# Patient Record
Sex: FEMALE | Employment: OTHER | URBAN - METROPOLITAN AREA
[De-identification: names, ages, dates, MRNs, and addresses within clinical notes are randomized per-mention and may not be internally consistent; named-entity substitution may affect disease eponyms.]

---

## 2024-03-28 ENCOUNTER — TELEPHONE (OUTPATIENT)
Dept: SURGERY | Age: 74
End: 2024-03-28

## 2024-03-28 DIAGNOSIS — C76.0 HEAD AND NECK CANCER (HCC): ICD-10-CM

## 2024-03-28 DIAGNOSIS — I87.8 POOR VENOUS ACCESS: ICD-10-CM

## 2024-03-28 NOTE — TELEPHONE ENCOUNTER
Per Dr. Blanco, patient is scheduled for EGD PEG tube insertion and Mediport insertion  at [facility] on 24. Surgery scheduled via epic, surgeon's calendar updated. Dr. Blnaco to enter orders. Follow up as needed.   Most recent labs/medication list/records requested from PCP.   Port and PEG to be managed by the [facility].   Electronically signed by Kaelyn Saucedo RN on 3/28/2024 at 1:57 PM    Prior Authorization Form:      DEMOGRAPHICS:                     Patient Name:  Adrienne Lambert  Patient :  1950            Insurance:  Payor: MEDICARE / Plan: MEDICARE PART A AND B / Product Type: *No Product type* /   Insurance ID Number:    Payer/Plan Subscr  Sex Relation Sub. Ins. ID Effective Group Num   1. MEDICARE - ME* ADRIENNE LAMBERT 1950 Female Self [id number] 3/1/24                                    PO [address]          DIAGNOSIS & PROCEDURE:                       Procedure/Operation: EGD PEG tube insertion and Mediport insertion            CPT Code: 12464 + 47200    Diagnosis:  head and neck cancer, poor venous access    ICD10 Code: C76.0, I87.8     Location:  [facility]    Surgeon:  Bella    SCHEDULING INFORMATION:                          Date: 24    Time: TBD              Anesthesia:  MAC/TIVA                                                       Status:  Outpatient        Special Comments:         Electronically signed by Kaelyn Saucedo RN on 3/28/2024 at 1:57 PM

## 2024-03-28 NOTE — PROGRESS NOTES
I recommended a preadmission testing appointment for patient.  Patient stated she would be out of town until 4/7 and would have to receive any testing day of surgery.

## 2024-03-29 RX ORDER — LOSARTAN POTASSIUM 100 MG/1
100 TABLET ORAL
COMMUNITY
Start: 2024-03-18 | End: 2024-06-16

## 2024-03-29 RX ORDER — LEVOTHYROXINE SODIUM 0.12 MG/1
125 TABLET ORAL DAILY
COMMUNITY

## 2024-03-29 RX ORDER — DICLOFENAC SODIUM 75 MG/1
75 TABLET, DELAYED RELEASE ORAL 2 TIMES DAILY
COMMUNITY
Start: 2024-03-18

## 2024-04-03 ENCOUNTER — SCHEDULED TELEPHONE ENCOUNTER (OUTPATIENT)
Dept: SURGERY | Age: 74
End: 2024-04-03
Payer: MEDICARE

## 2024-04-03 DIAGNOSIS — I87.8 POOR VENOUS ACCESS: Primary | ICD-10-CM

## 2024-04-03 DIAGNOSIS — C76.0 HEAD AND NECK CANCER (HCC): ICD-10-CM

## 2024-04-03 PROCEDURE — 99204 OFFICE O/P NEW MOD 45 MIN: CPT | Performed by: SURGERY

## 2024-04-03 PROCEDURE — 1123F ACP DISCUSS/DSCN MKR DOCD: CPT | Performed by: SURGERY

## 2024-04-03 RX ORDER — CLINDAMYCIN HYDROCHLORIDE 300 MG/1
CAPSULE ORAL
COMMUNITY
Start: 2024-04-01

## 2024-04-04 NOTE — PROGRESS NOTES
[facility]                                                                                                                    PRE OP INSTRUCTIONS FOR  Adrienne Garces        Date: 4/4/2024    Date of surgery: 04/08/2024   Arrival Time: Hospital will call you between 5pm and 7pm with your final arrival time for surgery. Go to     front of hospital and check in at information desk.    Nothing by mouth (NPO) as instructed. May have clear liquids up to 2 hours prior to surgery. Nothing solid after midnight. Examples: water, apple juice, black coffee, plain tea    Take the following medications with a small sip of water on the morning of Surgery: synthroid     Diabetics may take half the evening dose of insulin but none after midnight.  If you feel symptomatic or have low blood sugar morning of surgery drink 1-2 ounces of apple juice only. If you take a weekly insulin injection _______________, stop 7 days prior to surgery. If you take _______________, stop 3-4 days prior to surgery.    Aspirin, Ibuprofen, Advil, Naproxen, other Anti-inflammatory products should be stopped before surgery as directed by your surgeon, cardiologist, or primary care Doctor. Herbal supplements and Vitamin E should be stopped five days prior.  May take Tylenol unless instructed otherwise by your surgeon.    Check with your Doctor regarding stopping Plavix, Coumadin, Lovenox, Eliquis, Effient, or other blood thinners such as, pradaxa, lixiana, xaralto and savaysa.    Do not smoke, vape, or use illicit drugs and do not drink any alcoholic beverages 24 hours prior to surgery.    You may brush your teeth the morning of surgery.      You MUST make arrangements for a responsible adult, 18 and over, to take you home after your surgery. You will not be allowed to leave alone or drive yourself home.  You will need someone stay with you the first 24 hrs. Your surgery will be cancelled if you do not have a ride home or someone

## 2024-04-04 NOTE — PROGRESS NOTES
General Surgery History and Physical  [facility]  Telephone visit    Start time: 1300  End time: 1310  Services were provided through a telephone synchronous discussion virtually to substitute for in-person clinic visit. The patient was advised of the risks, benefits and alternatives to telemedicine and agreed to proceed with this type of visit.  Pursuant to the emergency declaration under the [place] Act and the National Emergencies Act, 1135 waiver authority and the Coronavirus Preparedness and Response Supplemental Appropriations Act, this Virtual  Visit was conducted, with patient's consent, to reduce the patient's risk of exposure to COVID-19 and provide continuity of care. The patient was also advised the privacy standards of a standard visit continue to apply to telemedicine visits.   Time spent time with telephone encounter with patient and family counciling and discussing care exceeded 10 min. Additional time spent reviewing images and labs, discussing case with nursing, support staff and other physicians; as well as coordinating care exceeded 10 min.        Patient's Name/Date of Birth: Adrienne Garces / 1950    Date: April 4, 2024     Surgeon: Adrian Blanco M.D.    PCP: Zachariah Randolph APRN - CNP     Chief Complaint: Poor venous access, mediport placement, PEG placement    HPI:   Adrienne Garces is a 73 y.o. female who presents for evaluation of poor venous access and mediport placement. Patient has a history of head and neck cancer and plans to begin Chemotherapy as soon as possible. They need a central venous catheter placed prior to commencing treatment.    Patient Active Problem List   Diagnosis    Poor venous access    Head and neck cancer (HCC)       Past Medical History:   Diagnosis Date    Arthritis     Hypertension     Hypothyroidism     Stomach ulcer        Past Surgical History:   Procedure Laterality Date    COLONOSCOPY  2022    Dr. Rosen /  Colon polyp

## 2024-04-08 ENCOUNTER — HOSPITAL ENCOUNTER (OUTPATIENT)
Dept: GENERAL RADIOLOGY | Age: 74
Discharge: HOME | End: 2024-04-10
Attending: SURGERY
Payer: MEDICARE

## 2024-04-08 ENCOUNTER — APPOINTMENT (OUTPATIENT)
Dept: GENERAL RADIOLOGY | Age: 74
End: 2024-04-08
Attending: SURGERY
Payer: MEDICARE

## 2024-04-08 ENCOUNTER — HOSPITAL ENCOUNTER (OUTPATIENT)
Age: 74
Setting detail: OUTPATIENT SURGERY
Discharge: HOME | End: 2024-04-08
Attending: SURGERY | Admitting: SURGERY
Payer: MEDICARE

## 2024-04-08 ENCOUNTER — ANESTHESIA EVENT (OUTPATIENT)
Dept: OPERATING ROOM | Age: 74
End: 2024-04-08
Payer: MEDICARE

## 2024-04-08 ENCOUNTER — ANESTHESIA (OUTPATIENT)
Dept: OPERATING ROOM | Age: 74
End: 2024-04-08
Payer: MEDICARE

## 2024-04-08 VITALS
DIASTOLIC BLOOD PRESSURE: 70 MMHG | TEMPERATURE: 97.7 F | SYSTOLIC BLOOD PRESSURE: 151 MMHG | RESPIRATION RATE: 16 BRPM | OXYGEN SATURATION: 96 % | HEART RATE: 62 BPM

## 2024-04-08 DIAGNOSIS — G89.18 POSTOPERATIVE PAIN: Primary | ICD-10-CM

## 2024-04-08 DIAGNOSIS — I87.8 POOR VENOUS ACCESS: ICD-10-CM

## 2024-04-08 PROBLEM — R63.8 DECREASED ORAL INTAKE: Status: ACTIVE | Noted: 2024-04-08

## 2024-04-08 LAB
EKG ATRIAL RATE: 61 BPM
EKG P AXIS: 46 DEGREES
EKG P-R INTERVAL: 160 MS
EKG Q-T INTERVAL: 448 MS
EKG QRS DURATION: 88 MS
EKG QTC CALCULATION (BAZETT): 450 MS
EKG R AXIS: 26 DEGREES
EKG T AXIS: 103 DEGREES
EKG VENTRICULAR RATE: 61 BPM
ERYTHROCYTE [DISTWIDTH] IN BLOOD BY AUTOMATED COUNT: 13.7 % (ref 11.5–15)
HCT VFR BLD AUTO: 42.7 % (ref 34–48)
HGB BLD-MCNC: 13.9 G/DL (ref 11.5–15.5)
MCH RBC QN AUTO: 29.1 PG (ref 26–35)
MCHC RBC AUTO-ENTMCNC: 32.6 G/DL (ref 32–34.5)
MCV RBC AUTO: 89.3 FL (ref 80–99.9)
PLATELET # BLD AUTO: 237 K/UL (ref 130–450)
PMV BLD AUTO: 9.6 FL (ref 7–12)
RBC # BLD AUTO: 4.78 M/UL (ref 3.5–5.5)
WBC OTHER # BLD: 5.6 K/UL (ref 4.5–11.5)

## 2024-04-08 PROCEDURE — 2720000010 HC SURG SUPPLY STERILE: Performed by: SURGERY

## 2024-04-08 PROCEDURE — 6370000000 HC RX 637 (ALT 250 FOR IP): Performed by: SURGERY

## 2024-04-08 PROCEDURE — 3700000001 HC ADD 15 MINUTES (ANESTHESIA): Performed by: SURGERY

## 2024-04-08 PROCEDURE — 6360000002 HC RX W HCPCS: Performed by: SURGERY

## 2024-04-08 PROCEDURE — 2580000003 HC RX 258: Performed by: SURGERY

## 2024-04-08 PROCEDURE — 85027 COMPLETE CBC AUTOMATED: CPT

## 2024-04-08 PROCEDURE — 2580000003 HC RX 258: Performed by: NURSE ANESTHETIST, CERTIFIED REGISTERED

## 2024-04-08 PROCEDURE — 7100000010 HC PHASE II RECOVERY - FIRST 15 MIN: Performed by: SURGERY

## 2024-04-08 PROCEDURE — 77001 FLUOROGUIDE FOR VEIN DEVICE: CPT | Performed by: SURGERY

## 2024-04-08 PROCEDURE — 2709999900 HC NON-CHARGEABLE SUPPLY: Performed by: SURGERY

## 2024-04-08 PROCEDURE — C1788 PORT, INDWELLING, IMP: HCPCS | Performed by: SURGERY

## 2024-04-08 PROCEDURE — 71045 X-RAY EXAM CHEST 1 VIEW: CPT

## 2024-04-08 PROCEDURE — 3600000003 HC SURGERY LEVEL 3 BASE: Performed by: SURGERY

## 2024-04-08 PROCEDURE — 99222 1ST HOSP IP/OBS MODERATE 55: CPT | Performed by: SURGERY

## 2024-04-08 PROCEDURE — 6360000002 HC RX W HCPCS: Performed by: NURSE ANESTHETIST, CERTIFIED REGISTERED

## 2024-04-08 PROCEDURE — A4216 STERILE WATER/SALINE, 10 ML: HCPCS | Performed by: SURGERY

## 2024-04-08 PROCEDURE — 7100000011 HC PHASE II RECOVERY - ADDTL 15 MIN: Performed by: SURGERY

## 2024-04-08 PROCEDURE — 2500000003 HC RX 250 WO HCPCS: Performed by: SURGERY

## 2024-04-08 PROCEDURE — 36561 INSERT TUNNELED CV CATH: CPT | Performed by: SURGERY

## 2024-04-08 PROCEDURE — 43246 EGD PLACE GASTROSTOMY TUBE: CPT | Performed by: SURGERY

## 2024-04-08 PROCEDURE — 93005 ELECTROCARDIOGRAM TRACING: CPT | Performed by: ANESTHESIOLOGY

## 2024-04-08 PROCEDURE — 93010 ELECTROCARDIOGRAM REPORT: CPT | Performed by: INTERNAL MEDICINE

## 2024-04-08 PROCEDURE — 6370000000 HC RX 637 (ALT 250 FOR IP): Performed by: ANESTHESIOLOGY

## 2024-04-08 PROCEDURE — 76937 US GUIDE VASCULAR ACCESS: CPT | Performed by: SURGERY

## 2024-04-08 PROCEDURE — 3600000013 HC SURGERY LEVEL 3 ADDTL 15MIN: Performed by: SURGERY

## 2024-04-08 PROCEDURE — 3700000000 HC ANESTHESIA ATTENDED CARE: Performed by: SURGERY

## 2024-04-08 PROCEDURE — 2580000003 HC RX 258: Performed by: ANESTHESIOLOGY

## 2024-04-08 DEVICE — SMART PORT CT SINGLE TITANIUM PORT SYSTEM WITH ATTACHABLE 8.0F X 66CM POLYURETHANE CATHETER AND 8 F INTRODUCER KIT (WITH VALVED INTRODUCER)
Type: IMPLANTABLE DEVICE | Site: ABDOMEN | Status: FUNCTIONAL
Brand: SMART PORT CT

## 2024-04-08 RX ORDER — OXYCODONE HYDROCHLORIDE AND ACETAMINOPHEN 5; 325 MG/1; MG/1
1 TABLET ORAL EVERY 6 HOURS PRN
Qty: 10 TABLET | Refills: 0 | Status: SHIPPED | OUTPATIENT
Start: 2024-04-08 | End: 2024-04-11

## 2024-04-08 RX ORDER — CLINDAMYCIN PHOSPHATE 900 MG/50ML
900 INJECTION, SOLUTION INTRAVENOUS ONCE
Status: COMPLETED | OUTPATIENT
Start: 2024-04-08 | End: 2024-04-08

## 2024-04-08 RX ORDER — SODIUM CHLORIDE, SODIUM LACTATE, POTASSIUM CHLORIDE, CALCIUM CHLORIDE 600; 310; 30; 20 MG/100ML; MG/100ML; MG/100ML; MG/100ML
INJECTION, SOLUTION INTRAVENOUS CONTINUOUS PRN
Status: DISCONTINUED | OUTPATIENT
Start: 2024-04-08 | End: 2024-04-08 | Stop reason: SDUPTHER

## 2024-04-08 RX ORDER — HYDRALAZINE HYDROCHLORIDE 20 MG/ML
10 INJECTION INTRAMUSCULAR; INTRAVENOUS
Status: DISCONTINUED | OUTPATIENT
Start: 2024-04-08 | End: 2024-04-08 | Stop reason: HOSPADM

## 2024-04-08 RX ORDER — HEPARIN 100 UNIT/ML
SYRINGE INTRAVENOUS PRN
Status: DISCONTINUED | OUTPATIENT
Start: 2024-04-08 | End: 2024-04-08 | Stop reason: ALTCHOICE

## 2024-04-08 RX ORDER — FENTANYL CITRATE 50 UG/ML
INJECTION, SOLUTION INTRAMUSCULAR; INTRAVENOUS PRN
Status: DISCONTINUED | OUTPATIENT
Start: 2024-04-08 | End: 2024-04-08 | Stop reason: SDUPTHER

## 2024-04-08 RX ORDER — KETOROLAC TROMETHAMINE 15 MG/ML
15 INJECTION, SOLUTION INTRAMUSCULAR; INTRAVENOUS
Status: DISCONTINUED | OUTPATIENT
Start: 2024-04-08 | End: 2024-04-08 | Stop reason: HOSPADM

## 2024-04-08 RX ORDER — LIDOCAINE HYDROCHLORIDE 20 MG/ML
INJECTION, SOLUTION INTRAVENOUS PRN
Status: DISCONTINUED | OUTPATIENT
Start: 2024-04-08 | End: 2024-04-08 | Stop reason: SDUPTHER

## 2024-04-08 RX ORDER — IPRATROPIUM BROMIDE AND ALBUTEROL SULFATE 2.5; .5 MG/3ML; MG/3ML
1 SOLUTION RESPIRATORY (INHALATION)
Status: DISCONTINUED | OUTPATIENT
Start: 2024-04-08 | End: 2024-04-08 | Stop reason: HOSPADM

## 2024-04-08 RX ORDER — SODIUM CHLORIDE 0.9 % (FLUSH) 0.9 %
5-40 SYRINGE (ML) INJECTION PRN
Status: DISCONTINUED | OUTPATIENT
Start: 2024-04-08 | End: 2024-04-08 | Stop reason: HOSPADM

## 2024-04-08 RX ORDER — MIDAZOLAM HYDROCHLORIDE 1 MG/ML
INJECTION INTRAMUSCULAR; INTRAVENOUS PRN
Status: DISCONTINUED | OUTPATIENT
Start: 2024-04-08 | End: 2024-04-08 | Stop reason: SDUPTHER

## 2024-04-08 RX ORDER — SODIUM CHLORIDE 9 MG/ML
INJECTION, SOLUTION INTRAMUSCULAR; INTRAVENOUS; SUBCUTANEOUS PRN
Status: DISCONTINUED | OUTPATIENT
Start: 2024-04-08 | End: 2024-04-08 | Stop reason: ALTCHOICE

## 2024-04-08 RX ORDER — MEPERIDINE HYDROCHLORIDE 25 MG/ML
12.5 INJECTION INTRAMUSCULAR; INTRAVENOUS; SUBCUTANEOUS EVERY 5 MIN PRN
Status: DISCONTINUED | OUTPATIENT
Start: 2024-04-08 | End: 2024-04-08 | Stop reason: HOSPADM

## 2024-04-08 RX ORDER — DIPHENHYDRAMINE HYDROCHLORIDE 50 MG/ML
12.5 INJECTION INTRAMUSCULAR; INTRAVENOUS
Status: DISCONTINUED | OUTPATIENT
Start: 2024-04-08 | End: 2024-04-08 | Stop reason: HOSPADM

## 2024-04-08 RX ORDER — NALOXONE HYDROCHLORIDE 0.4 MG/ML
INJECTION, SOLUTION INTRAMUSCULAR; INTRAVENOUS; SUBCUTANEOUS PRN
Status: DISCONTINUED | OUTPATIENT
Start: 2024-04-08 | End: 2024-04-08 | Stop reason: HOSPADM

## 2024-04-08 RX ORDER — MIDAZOLAM HYDROCHLORIDE 1 MG/ML
2 INJECTION INTRAMUSCULAR; INTRAVENOUS
Status: DISCONTINUED | OUTPATIENT
Start: 2024-04-08 | End: 2024-04-08 | Stop reason: HOSPADM

## 2024-04-08 RX ORDER — FENTANYL CITRATE 0.05 MG/ML
25 INJECTION, SOLUTION INTRAMUSCULAR; INTRAVENOUS EVERY 5 MIN PRN
Status: DISCONTINUED | OUTPATIENT
Start: 2024-04-08 | End: 2024-04-08 | Stop reason: HOSPADM

## 2024-04-08 RX ORDER — SODIUM CHLORIDE 0.9 % (FLUSH) 0.9 %
5-40 SYRINGE (ML) INJECTION EVERY 12 HOURS SCHEDULED
Status: DISCONTINUED | OUTPATIENT
Start: 2024-04-08 | End: 2024-04-08 | Stop reason: HOSPADM

## 2024-04-08 RX ORDER — LABETALOL HYDROCHLORIDE 5 MG/ML
10 INJECTION, SOLUTION INTRAVENOUS
Status: DISCONTINUED | OUTPATIENT
Start: 2024-04-08 | End: 2024-04-08 | Stop reason: HOSPADM

## 2024-04-08 RX ORDER — SODIUM CHLORIDE, SODIUM LACTATE, POTASSIUM CHLORIDE, CALCIUM CHLORIDE 600; 310; 30; 20 MG/100ML; MG/100ML; MG/100ML; MG/100ML
INJECTION, SOLUTION INTRAVENOUS CONTINUOUS
Status: DISCONTINUED | OUTPATIENT
Start: 2024-04-08 | End: 2024-04-08 | Stop reason: HOSPADM

## 2024-04-08 RX ORDER — SODIUM CHLORIDE 9 MG/ML
INJECTION, SOLUTION INTRAVENOUS CONTINUOUS
Status: DISCONTINUED | OUTPATIENT
Start: 2024-04-08 | End: 2024-04-08 | Stop reason: HOSPADM

## 2024-04-08 RX ORDER — FENTANYL CITRATE 0.05 MG/ML
50 INJECTION, SOLUTION INTRAMUSCULAR; INTRAVENOUS EVERY 5 MIN PRN
Status: DISCONTINUED | OUTPATIENT
Start: 2024-04-08 | End: 2024-04-08 | Stop reason: HOSPADM

## 2024-04-08 RX ORDER — OXYCODONE HYDROCHLORIDE AND ACETAMINOPHEN 5; 325 MG/1; MG/1
1 TABLET ORAL ONCE
Status: COMPLETED | OUTPATIENT
Start: 2024-04-08 | End: 2024-04-08

## 2024-04-08 RX ORDER — KETOROLAC TROMETHAMINE 30 MG/ML
INJECTION, SOLUTION INTRAMUSCULAR; INTRAVENOUS PRN
Status: DISCONTINUED | OUTPATIENT
Start: 2024-04-08 | End: 2024-04-08 | Stop reason: SDUPTHER

## 2024-04-08 RX ORDER — PROPOFOL 10 MG/ML
INJECTION, EMULSION INTRAVENOUS PRN
Status: DISCONTINUED | OUTPATIENT
Start: 2024-04-08 | End: 2024-04-08 | Stop reason: SDUPTHER

## 2024-04-08 RX ORDER — SODIUM CHLORIDE 9 MG/ML
INJECTION, SOLUTION INTRAVENOUS PRN
Status: DISCONTINUED | OUTPATIENT
Start: 2024-04-08 | End: 2024-04-08 | Stop reason: HOSPADM

## 2024-04-08 RX ADMIN — FENTANYL CITRATE 50 MCG: 50 INJECTION, SOLUTION INTRAMUSCULAR; INTRAVENOUS at 13:34

## 2024-04-08 RX ADMIN — OXYCODONE AND ACETAMINOPHEN 1 TABLET: 5; 325 TABLET ORAL at 15:03

## 2024-04-08 RX ADMIN — LIDOCAINE HYDROCHLORIDE 20 MG: 20 INJECTION, SOLUTION INTRAVENOUS at 13:34

## 2024-04-08 RX ADMIN — PROPOFOL 30 MG: 10 INJECTION, EMULSION INTRAVENOUS at 14:02

## 2024-04-08 RX ADMIN — SODIUM CHLORIDE, POTASSIUM CHLORIDE, SODIUM LACTATE AND CALCIUM CHLORIDE: 600; 310; 30; 20 INJECTION, SOLUTION INTRAVENOUS at 12:26

## 2024-04-08 RX ADMIN — MIDAZOLAM 1 MG: 1 INJECTION INTRAMUSCULAR; INTRAVENOUS at 13:20

## 2024-04-08 RX ADMIN — PROPOFOL 70 MCG/KG/MIN: 10 INJECTION, EMULSION INTRAVENOUS at 13:35

## 2024-04-08 RX ADMIN — PROPOFOL 50 MG: 10 INJECTION, EMULSION INTRAVENOUS at 13:34

## 2024-04-08 RX ADMIN — FENTANYL CITRATE 25 MCG: 50 INJECTION, SOLUTION INTRAMUSCULAR; INTRAVENOUS at 13:44

## 2024-04-08 RX ADMIN — CLINDAMYCIN IN 5 PERCENT DEXTROSE 900 MG: 18 INJECTION, SOLUTION INTRAVENOUS at 13:20

## 2024-04-08 RX ADMIN — SODIUM CHLORIDE, POTASSIUM CHLORIDE, SODIUM LACTATE AND CALCIUM CHLORIDE: 600; 310; 30; 20 INJECTION, SOLUTION INTRAVENOUS at 13:20

## 2024-04-08 RX ADMIN — KETOROLAC TROMETHAMINE 15 MG: 30 INJECTION, SOLUTION INTRAMUSCULAR at 14:12

## 2024-04-08 RX ADMIN — FENTANYL CITRATE 25 MCG: 50 INJECTION, SOLUTION INTRAMUSCULAR; INTRAVENOUS at 14:03

## 2024-04-08 ASSESSMENT — PAIN DESCRIPTION - LOCATION: LOCATION: ABDOMEN

## 2024-04-08 ASSESSMENT — PAIN - FUNCTIONAL ASSESSMENT
PAIN_FUNCTIONAL_ASSESSMENT: 0-10
PAIN_FUNCTIONAL_ASSESSMENT: NONE - DENIES PAIN
PAIN_FUNCTIONAL_ASSESSMENT: 0-10

## 2024-04-08 ASSESSMENT — PAIN SCALES - GENERAL: PAINLEVEL_OUTOF10: 7

## 2024-04-08 NOTE — DISCHARGE INSTRUCTIONS
stomach (nauseated) or you may vomit. Sometimes anesthesia can make you feel sick. It's a common side effect and often doesn't last long. Pain also can make you feel sick or vomit. After the anesthesia wears off, you may feel pain from the incision (cut). That pain could then upset your stomach. Taking pain medicine can also make you feel sick to your stomach.  Whatever the cause, you may get medicine that can help. There are also some things you can do at home to prevent nausea and feel better.  The doctor has checked you carefully, but problems can develop later. If you notice any problems or new symptoms, get medical treatment right away.  Follow-up care is a key part of your treatment and safety. Be sure to make and go to all appointments, and call your doctor if you are having problems. It's also a good idea to know your test results and keep a list of the medicines you take.  How can you care for yourself at home?  Be safe with medicines. Read and follow all instructions on the label.  If the doctor gave you a prescription medicine for pain, take it as prescribed.  If you are not taking a prescription pain medicine, ask your doctor if you can take an over-the-counter medicine.  Take your pain medicine as soon as you have pain. It works better if you take it before the pain gets bad.  Call your doctor if you have any problems with your medicine.  Rest in bed until you feel better.  To prevent dehydration, drink plenty of fluids. Choose water and other clear liquids until you feel better. If you have kidney, heart, or liver disease and have to limit fluids, talk with your doctor before you increase the amount of fluids you drink.  When you are able to eat, try clear soups, mild foods, and liquids until all symptoms are gone for 12 to 48 hours. Other good choices include dry toast, crackers, cooked cereal, and gelatin dessert, such as Jell-O.  Do not smoke. Smoking and being around smoke can make nausea worse. If  to https://www.[employer].net/patientEd and enter C340 to learn more about \"Infection After Surgery: Care Instructions.\"  Current as of: July 26, 2023               Content Version: 14.0  © 5885-9319 [employer].   Care instructions adapted under license by [employer]. If you have questions about a medical condition or this instruction, always ask your healthcare professional. [employer] disclaims any warranty or liability for your use of this information.       <-- Click to add NO pertinent Past Medical History

## 2024-04-08 NOTE — OP NOTE
Adrienne Garces  [facility]   Operative Report    DATE OF PROCEDURE: 4/8/2024    SURGEON: Dr. Adrian Blanco MD    ASSISTANT: Bri PAULA, Jennifer Lobato, [employer]     PREOPERATIVE DIAGNOSIS: Venous insufficiency (I87.2), Head and neck Cancer    POSTOPERATIVE DIAGNOSIS: Same.     OPERATION:  Insertion of central venous catheter with subcutaneous port.(72355); Fluoroscopy; Ultrasound guidance    ANESTHESIA: LMAC     ESTIMATED BLOOD LOSS: 2 cc    COMPLICATIONS: None.     IMPLANT:  Implant Name Type Inv. Item Serial No.  Lot No. LRB No. Used Action   PORT SMARTPORT 8FR CT TI W/VLV [facility] - [id number] Port PORT SMARTPORT 8FR CT TI W/VLV [facility]  [facility] [id number] N/A 1 Implanted       HISTORY: Adrienne Garces is a  73 y.o.  female who has poor venous access and head and neck cancer with need for access to receive chemotherapy.    OPERATION: The patient was placed on the table in a supine position. She  was given IV antibiotics within 30min of incision.  The skin was prepped with ChloroPrep and draped in the usual fashion. The patient was placed in a headdown position. Ultrasound was used to find the left internal jugular vein.   The skin was numbed with marcaine plus epinephrine.  The vein was canulated and the wire passed easily. The puncture skin site was enlarged with a 15 blade scalpel. The chest skin was numbed with lidocaine and an incision was made for the port. Cautery was used to dissect down and form a pocket. Tunneling device was attached to the catheter and tunneled from the neck to the chest incision. The introducer dilator was passed over the wire and the catheter was passed through the introducer so the tip was in the superior vena cava using fluoroscopy. The catheter was cut to the correct length under fluoroscopic guidance.  The port was attached to the catheter and placed into the pocket. Sutures were placed in the chest  fascia and clamped with a hemostat.  The sutures holding the port to the fascia were tied. A Seaman needle was used to access the port and flush with saline. The catheter mariola back blood well. It was then flushed with Heplock solution. The dermis was closed with 4-0 Monocryl. Derma+flex glue was placed on the incision, no dressing. The needle and sponge count were correct. The patient tolerated the procedure well and went to recovery in stable condition.  A chest X-ray was ordered to check for catheter placement.    Physician Signature: Electronically signed by Dr. Adrian Blanco MD, M.D.

## 2024-04-08 NOTE — ANESTHESIA PRE PROCEDURE
\"GFRAA\", \"AGRATIO\", \"LABGLOM\", \"GLUCOSE\", \"GLU\", \"PROT\", \"CALCIUM\", \"BILITOT\", \"ALKPHOS\", \"AST\", \"ALT\"    POC Tests: No results for input(s): \"POCGLU\", \"POCNA\", \"POCK\", \"POCCL\", \"POCBUN\", \"POCHEMO\", \"POCHCT\" in the last 72 hours.    Coags: No results found for: \"PROTIME\", \"INR\", \"APTT\"    HCG (If Applicable): No results found for: \"PREGTESTUR\", \"PREGSERUM\", \"HCG\", \"HCGQUANT\"     ABGs: No results found for: \"PHART\", \"PO2ART\", \"XOG4MHU\", \"TAJ0KEY\", \"BEART\", \"J9ETCTCG\"     Type & Screen (If Applicable):  No results found for: \"LABABO\", \"LABRH\"    Drug/Infectious Status (If Applicable):  No results found for: \"HIV\", \"HEPCAB\"    COVID-19 Screening (If Applicable): No results found for: \"COVID19\"        Anesthesia Evaluation  Patient summary reviewed   no history of anesthetic complications:   Airway: Mallampati: II  TM distance: >3 FB   Neck ROM: full  Mouth opening: > = 3 FB   Dental: normal exam         Pulmonary:Negative Pulmonary ROS breath sounds clear to auscultation                             Cardiovascular:    (+) hypertension:        Rhythm: regular  Rate: normal                    Neuro/Psych:   Negative Neuro/Psych ROS              GI/Hepatic/Renal:   (+) PUD          Endo/Other:    (+) hypothyroidism::., malignancy/cancer (Head and neck cancer-to start radiation treatment and chemotherapy later this week).    (-) blood dyscrasia               Abdominal:             Vascular: negative vascular ROS.         Other Findings:             Anesthesia Plan      MAC     ASA 3           MIPS: Postoperative opioids intended and Prophylactic antiemetics administered.  Anesthetic plan and risks discussed with patient.      Plan discussed with CRNA.                DOS STAFF ADDENDUM:    Pt seen and examined, chart reviewed (including anesthesia, drug and allergy history).       Anesthetic plan, risks, benefits, alternatives, and personnel involved discussed with patient.  Patient verbalized an understanding and agrees to

## 2024-04-08 NOTE — H&P
General Surgery History and Physical  [facility]    Patient's Name/Date of Birth: Adrienne Garces / 1950    Date: April 8, 2024     Surgeon: Adrian Blanco M.D.    PCP: Zachariah Randolph APRN - CNP     Chief Complaint: Poor venous access, mediport placement    HPI:   Adrienne Garces is a 73 y.o. female who presents for evaluation of poor venous access and mediport placement. Patient has a history of oral cancer and plans to begin Chemotherapy as soon as possible. They need a central venous catheter placed prior to commencing treatment.    Patient Active Problem List   Diagnosis    Poor venous access    Head and neck cancer (HCC)       Past Medical History:   Diagnosis Date    Arthritis     Cancer (HCC)     Hypertension     Hypothyroidism     Stomach ulcer        Past Surgical History:   Procedure Laterality Date    COLONOSCOPY  2022    Dr. Rosen /  Colon polyp    ESOPHAGOGASTRODUODENOSCOPY  2022    Dr. Rosen / History of Ulcers    LARYNGOSCOPY  2024    Dr. Doss    TOTAL KNEE ARTHROPLASTY Right 10/2023    TUBAL LIGATION  1984       Allergies   Allergen Reactions    Escitalopram Other (See Comments)     Lower extremity edema     Ginkgo Biloba Rash    Keflex [Cephalexin] Rash    Penicillins Rash    Sulfa Antibiotics Rash       The patient has a family history that is negative for severe cardiovascular or respiratory issues, negative for reaction to anesthesia.    Social History     Socioeconomic History    Marital status:      Spouse name: Not on file    Number of children: Not on file    Years of education: Not on file    Highest education level: Not on file   Occupational History    Not on file   Tobacco Use    Smoking status: Never    Smokeless tobacco: Never   Substance and Sexual Activity    Alcohol use: Yes     Alcohol/week: 1.0 standard drink of alcohol     Types: 1 Glasses of wine per week     Comment: 1-2 drinks a week mixed drink    Drug use: Never    Sexual activity:

## 2024-04-08 NOTE — ANESTHESIA POSTPROCEDURE EVALUATION
Department of Anesthesiology  Postprocedure Note    Patient: Adrienne Garces  MRN: [id number]  YOB: 1950  Date of evaluation: 4/8/2024    Procedure Summary       Date: 04/08/24 Room / Location: [address]    Anesthesia Start: 1320 Anesthesia Stop: 1418    Procedures:       EGD PEG TUBE INSERTION AND MEDIPORT INSERTION [14591 + 67733] (Chest)      ESOPHAGOGASTRODUODENOSCOPY PERCUTANEOUS ENDOSCOPIC GASTROSTOMY TUBE PLACEMENT (Abdomen) Diagnosis:       Poor venous access      Head and neck cancer (HCC)      (Poor venous access [I87.8])      (Head and neck cancer (HCC) [C76.0])    Surgeons: Adrian Blanco MD Responsible Provider: Quinn Crespo MD    Anesthesia Type: MAC ASA Status: 3            Anesthesia Type: No value filed.    Kvng Phase I: Kvng Score: 10    Kvng Phase II: Kvng Score: 10    Anesthesia Post Evaluation    Patient location during evaluation: PACU  Patient participation: complete - patient participated  Level of consciousness: awake  Airway patency: patent  Nausea & Vomiting: no nausea and no vomiting  Cardiovascular status: hemodynamically stable  Respiratory status: acceptable  Hydration status: euvolemic  Pain management: adequate    No notable events documented.

## 2024-04-08 NOTE — OP NOTE
[facility]  OPERATIVE NOTE    4/8/2024  Adrienne Garces  1:59 PM    PREOPERATIVE DIAGNOSES: Malnutrition, dysphagia, head and neck cancer    POSTOPERATIVE DIAGNOSES: Malnutrition, dysphagia, head and neck cancer    OPERATION: Percutaneous endoscopic gastrostomy tube placement with upper endoscopy.     SURGEON: Adrian Blanco M.D.    ASSISTANT: KEARA Gregory     ANESTHESIA: MAC     ESTIMATED BLOOD LOSS: Minimal.     COMPLICATIONS: None.     SPECIMEN: None.     DESCRIPTION OF PROCEDURE: PROCEDURE: The patient was taken to the endoscopy suite and placed on the endoscopy table in a supine position. LMAC anesthesia was administered. A bite block was inserted.   A lubricated gastroscope was inserted into the oropharynx and advanced under direct vision to the esophagus and then the stomach. The stomach was insufflated. The anterior abdominal wall was transilluminated. The light source was easily visualized. Indentation was observed with palpation of the abdominal wall.  This area was prepped and draped. Local anesthetic was injected. A #11 blade was used to make a transverse incision. A snare forceps was inserted through the gastroscope and deployed into the gastric lumen. An angiocatheter was inserted through the incision into the gastric lumen under direct vision. A wire was inserted through the anterior catheter and grasped   with the snare forceps. The gastroscope, snare, and wire were then pulled out through the patient's mouth. The gastrostomy tube was connected to the wire, and the wire was pulled through the stomach and out through the anterior abdominal wall. The gastroscope was reintroduced into the stomach. The PEG tube was seen in good position without evidence of bleeding. The gastroscope was removed. The PEG tube was cut to size and fit with a bumper and a feeding apparatus at 4 cm at the abdominal wall. The patient tolerated the procedure well and went to the

## 2024-04-16 ENCOUNTER — TELEPHONE (OUTPATIENT)
Dept: SURGERY | Age: 74
End: 2024-04-16

## 2024-04-16 NOTE — TELEPHONE ENCOUNTER
Message received that patient called in complaining that her PEG tube felt too tight against her abdomen.  Attempted to call patient to discuss. VM left with call back information.   Electronically signed by Kaelyn Saucedo RN on 4/16/2024 at 10:19 AM

## 2024-04-24 ENCOUNTER — OFFICE VISIT (OUTPATIENT)
Dept: SURGERY | Age: 74
End: 2024-04-24
Payer: MEDICARE

## 2024-04-24 VITALS — HEART RATE: 69 BPM | SYSTOLIC BLOOD PRESSURE: 147 MMHG | TEMPERATURE: 97.8 F | DIASTOLIC BLOOD PRESSURE: 86 MMHG

## 2024-04-24 DIAGNOSIS — K94.22 INFECTION OF PEG SITE (HCC): Primary | ICD-10-CM

## 2024-04-24 PROCEDURE — 99212 OFFICE O/P EST SF 10 MIN: CPT | Performed by: SURGERY

## 2024-04-24 PROCEDURE — G8427 DOCREV CUR MEDS BY ELIG CLIN: HCPCS | Performed by: SURGERY

## 2024-04-24 PROCEDURE — 1036F TOBACCO NON-USER: CPT | Performed by: SURGERY

## 2024-04-24 PROCEDURE — 3017F COLORECTAL CA SCREEN DOC REV: CPT | Performed by: SURGERY

## 2024-04-24 PROCEDURE — 1123F ACP DISCUSS/DSCN MKR DOCD: CPT | Performed by: SURGERY

## 2024-04-24 PROCEDURE — G8400 PT W/DXA NO RESULTS DOC: HCPCS | Performed by: SURGERY

## 2024-04-24 PROCEDURE — 1090F PRES/ABSN URINE INCON ASSESS: CPT | Performed by: SURGERY

## 2024-04-24 PROCEDURE — G8421 BMI NOT CALCULATED: HCPCS | Performed by: SURGERY

## 2024-04-24 RX ORDER — DIAZEPAM 5 MG/1
TABLET ORAL
COMMUNITY
Start: 2024-04-19

## 2024-04-24 RX ORDER — CIPROFLOXACIN 500 MG/1
500 TABLET, FILM COATED ORAL 2 TIMES DAILY
COMMUNITY

## 2024-04-24 RX ORDER — ONDANSETRON 4 MG/1
TABLET, ORALLY DISINTEGRATING ORAL
COMMUNITY

## 2024-04-24 NOTE — PROGRESS NOTES
General Surgery Office Note  [facility]  Adrian Blanoc MD, MS    Patient's Name/Date of Birth: Adrienne Garces / 1950    Date: April 24, 2024     Surgeon: MD Bella    Chief Complaint: PEG site    Patient Active Problem List   Diagnosis    Poor venous access    Head and neck cancer (HCC)    Decreased oral intake       Subjective: doing well, PEG infection s/p I&D by another provider, on Cipro, no fever, states pain improving    Objective:  BP (!) 147/86 (Site: Left Upper Arm, Position: Sitting, Cuff Size: Medium Adult)   Pulse 69   Temp 97.8 °F (36.6 °C)   Labs:        General appearance: AA, NAD  HEENT: NCAT, PERRLA, EOMI  Lungs: Clear, equal rise bilateral  Heart: Reg  Abdomen: soft, nondistended, obese, cellulitis and no induration, exudate around the tube, 4.5cm at the skin  Skin: No lesions, incisions well healed  Psych: No distress, conversive, no hallucinations  : No ulcers or lesions  Rectal: No bleeding    A complete 10 system review was performed and are otherwise negative unless mentioned in the above HPI. Specific negatives are listed below but may not include all those reviewed.    General ROS: negative obtundation, AMS  ENT ROS: negative rhinorrhea, epistaxis  Allergy and Immunology ROS: negative itchy/watery eyes or nasal congestion  Hematological and Lymphatic ROS: negative spontaneous bleeding or bruising  Endocrine ROS: negative  lethargy, mood swings, palpitations or polydipsia/polyuria  Respiratory ROS: negative sputum changes, stridor, tachypnea or wheezing  Cardiovascular ROS: negative for - loss of consciousness, murmur or orthopnea  Gastrointestinal ROS: negative for - hematochezia or hematemesis  Genito-Urinary ROS: negative for -  genital discharge or hematuria  Musculoskeletal ROS: negative for - focal weakness, gangrene  Psych/Neuro ROS: negative for - visual or auditory hallucinations, suicidal ideation      Time spent reviewing past medical, surgical,

## 2024-06-26 ENCOUNTER — OFFICE VISIT (OUTPATIENT)
Dept: SURGERY | Age: 74
End: 2024-06-26
Payer: MEDICARE

## 2024-06-26 VITALS — TEMPERATURE: 97.7 F

## 2024-06-26 DIAGNOSIS — R63.8 DECREASED ORAL INTAKE: Primary | ICD-10-CM

## 2024-06-26 DIAGNOSIS — C76.0 HEAD AND NECK CANCER (HCC): ICD-10-CM

## 2024-06-26 PROCEDURE — 1090F PRES/ABSN URINE INCON ASSESS: CPT | Performed by: SURGERY

## 2024-06-26 PROCEDURE — 1123F ACP DISCUSS/DSCN MKR DOCD: CPT | Performed by: SURGERY

## 2024-06-26 PROCEDURE — 99212 OFFICE O/P EST SF 10 MIN: CPT | Performed by: SURGERY

## 2024-06-26 PROCEDURE — 3017F COLORECTAL CA SCREEN DOC REV: CPT | Performed by: SURGERY

## 2024-06-26 PROCEDURE — 1036F TOBACCO NON-USER: CPT | Performed by: SURGERY

## 2024-06-26 PROCEDURE — G8427 DOCREV CUR MEDS BY ELIG CLIN: HCPCS | Performed by: SURGERY

## 2024-06-26 PROCEDURE — G8400 PT W/DXA NO RESULTS DOC: HCPCS | Performed by: SURGERY

## 2024-06-26 PROCEDURE — G8421 BMI NOT CALCULATED: HCPCS | Performed by: SURGERY

## 2024-06-26 NOTE — PROGRESS NOTES
General Surgery History and Physical  [facility]    Patient's Name/Date of Birth: Adrienne Garces / 1950    Date: June 26, 2024     Surgeon: Adrian Blanco M.D.    PCP: Zachariah Randolph APRN - CNP     Chief Complaint: PEG in place wants removed    HPI:   Adrienne Garces is a 74 y.o. female who presents for evaluation of PEG in place and no longer using. Timing is constant, radiation to LUQ, alleviated by none and started 4/8/2024 and severity is 5/10    Patient Active Problem List   Diagnosis    Poor venous access    Head and neck cancer (HCC)    Decreased oral intake       Past Medical History:   Diagnosis Date    Arthritis     Cancer (HCC)     Hypertension     Hypothyroidism     Stomach ulcer        Past Surgical History:   Procedure Laterality Date    COLONOSCOPY  2022    Dr. Rosen /  Colon polyp    ESOPHAGOGASTRODUODENOSCOPY  2022    Dr. Rosen / History of Ulcers    LARYNGOSCOPY  2024    Dr. Doss    PORT SURGERY N/A 4/8/2024    EGD PEG TUBE INSERTION AND MEDIPORT INSERTION [72971 + 44895] performed by Adrian Blanco MD at [facility] OR    TOTAL KNEE ARTHROPLASTY Right 10/2023    TUBAL LIGATION  1984    UPPER GASTROINTESTINAL ENDOSCOPY N/A 4/8/2024    ESOPHAGOGASTRODUODENOSCOPY PERCUTANEOUS ENDOSCOPIC GASTROSTOMY TUBE PLACEMENT performed by Adrian Blanco MD at [facility] OR       Allergies   Allergen Reactions    Escitalopram Other (See Comments)     Lower extremity edema     Ginkgo Biloba Rash    Keflex [Cephalexin] Rash    Penicillins Rash    Sulfa Antibiotics Rash       The patient has a family history that is negative for severe cardiovascular or respiratory issues, negative for reaction to anesthesia.   Specifically the patient reported no history of gastrointestinal cancer in his mother or his father to their knowledge.    Time spent reviewing past medical, surgical, social and family history, vitals, nursing assessment and images. No changes from above documented

## 2024-11-05 ENCOUNTER — OFFICE VISIT (OUTPATIENT)
Dept: ENT CLINIC | Age: 74
End: 2024-11-05
Payer: MEDICARE

## 2024-11-05 VITALS
BODY MASS INDEX: 32.79 KG/M2 | HEIGHT: 60 IN | SYSTOLIC BLOOD PRESSURE: 137 MMHG | DIASTOLIC BLOOD PRESSURE: 77 MMHG | WEIGHT: 167 LBS | TEMPERATURE: 97 F | HEART RATE: 63 BPM

## 2024-11-05 DIAGNOSIS — C10.9 OROPHARYNX CANCER (HCC): ICD-10-CM

## 2024-11-05 DIAGNOSIS — T66.XXXA RADIATION INJURY, INITIAL ENCOUNTER: Primary | ICD-10-CM

## 2024-11-05 DIAGNOSIS — R68.2 DRY MOUTH: ICD-10-CM

## 2024-11-05 DIAGNOSIS — R13.12 OROPHARYNGEAL DYSPHAGIA: ICD-10-CM

## 2024-11-05 PROCEDURE — G8400 PT W/DXA NO RESULTS DOC: HCPCS | Performed by: OTOLARYNGOLOGY

## 2024-11-05 PROCEDURE — 3017F COLORECTAL CA SCREEN DOC REV: CPT | Performed by: OTOLARYNGOLOGY

## 2024-11-05 PROCEDURE — 1036F TOBACCO NON-USER: CPT | Performed by: OTOLARYNGOLOGY

## 2024-11-05 PROCEDURE — 31575 DIAGNOSTIC LARYNGOSCOPY: CPT | Performed by: OTOLARYNGOLOGY

## 2024-11-05 PROCEDURE — G8484 FLU IMMUNIZE NO ADMIN: HCPCS | Performed by: OTOLARYNGOLOGY

## 2024-11-05 PROCEDURE — 1159F MED LIST DOCD IN RCRD: CPT | Performed by: OTOLARYNGOLOGY

## 2024-11-05 PROCEDURE — 1090F PRES/ABSN URINE INCON ASSESS: CPT | Performed by: OTOLARYNGOLOGY

## 2024-11-05 PROCEDURE — 99204 OFFICE O/P NEW MOD 45 MIN: CPT | Performed by: OTOLARYNGOLOGY

## 2024-11-05 PROCEDURE — 1123F ACP DISCUSS/DSCN MKR DOCD: CPT | Performed by: OTOLARYNGOLOGY

## 2024-11-05 PROCEDURE — G8417 CALC BMI ABV UP PARAM F/U: HCPCS | Performed by: OTOLARYNGOLOGY

## 2024-11-05 PROCEDURE — G8427 DOCREV CUR MEDS BY ELIG CLIN: HCPCS | Performed by: OTOLARYNGOLOGY

## 2024-11-05 NOTE — PROGRESS NOTES
Pt here for . 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 No issues swallowing. Patients voice is Horse. Patient is have more phlepm then normal. Drinks 24oz of water, 1 iced coffee of caffeine, and does not drink alcohol. Weight 167lb patient started at 203lbs, Patient states that she has lost more weight since chemo has stopped. Finished Chemo May 16th, stopped radiation June 3rd.  Takes the Synthroid but family doctor has been tweaking it.

## 2024-11-05 NOTE — PROGRESS NOTES
Dear Zachariah Prasad, APRN - CNP Eli Doss MD     We had the pleasure of seeing Adrienne Garces, 1950 here    on 11/5/2024  Please see below for review of care and plans.     Chief complaint- No diagnosis found.      History of Present Illness-     Chemo xrt at [facility]    11/5/24: Pt here for . 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 Doss ion 03/24.  Patient is in no pain. No issues swallowing. Patients voice is Horse. Patient is have more phlepm then normal. Drinks 24oz of water, 1 iced coffee of caffeine, and does not drink alcohol. Weight 167lb patient started at 203lbs,  Patient states that she has lost more weight since chemo has stopped. Finished Chemo at the [facility] May 16th, Received 33 sessions of XRT at [facility] (start 4/11/24 and finished 06/3/24).  Repeat PET CT in 09/2024 showed interval decreased uptake of previous right level IIA mass. Takes the Synthroid but family doctor has been tweaking it.     Review of Systems- No drainage, discharge, or headache.  Complete 10 system ROS completed and negative except as noted above.     Physical Examination-   Vital Signs-/77 (Site: Right Upper Arm, Position: Sitting, Cuff Size: Medium Adult)   Pulse 63   Temp 97 °F (36.1 °C)   Ht 1.524 m (5')   Wt 75.8 kg (167 lb)   BMI 32.61 kg/m²     Ears- Tympanic membranes clear bilaterally.  No middle ear effusion.  No pre or post auricular tenderness.  Nose- Nasal mucosa clear and dry.  No significant septal deviation or inferior turbinate hypertrophy.  Oral Cavity/Oropharynx- Floor of mouth and tongue are soft and nontender.  No posterior pharyngeal erythema. + gag reflex  Neck- Soft and nontender.  No masses, lesions, lymphadenopathy, or thyroid nodules appreciated. Mild xrt changes  Cranial Nerve- Cranial nerves II to XII intact.  Extraocular muscles intact.  No gross motor visual deficits.  No

## 2025-01-07 ENCOUNTER — OFFICE VISIT (OUTPATIENT)
Dept: ENT CLINIC | Age: 75
End: 2025-01-07
Payer: MEDICARE

## 2025-01-07 VITALS
TEMPERATURE: 97.6 F | HEART RATE: 84 BPM | HEIGHT: 60 IN | OXYGEN SATURATION: 94 % | RESPIRATION RATE: 16 BRPM | WEIGHT: 168 LBS | DIASTOLIC BLOOD PRESSURE: 82 MMHG | SYSTOLIC BLOOD PRESSURE: 136 MMHG | BODY MASS INDEX: 32.98 KG/M2

## 2025-01-07 DIAGNOSIS — R49.0 DYSPHONIA: ICD-10-CM

## 2025-01-07 DIAGNOSIS — T66.XXXA RADIATION INJURY, INITIAL ENCOUNTER: Primary | ICD-10-CM

## 2025-01-07 DIAGNOSIS — E04.1 THYROID NODULE: ICD-10-CM

## 2025-01-07 DIAGNOSIS — R13.12 OROPHARYNGEAL DYSPHAGIA: ICD-10-CM

## 2025-01-07 DIAGNOSIS — C10.9 OROPHARYNX CANCER (HCC): ICD-10-CM

## 2025-01-07 PROCEDURE — 1159F MED LIST DOCD IN RCRD: CPT | Performed by: OTOLARYNGOLOGY

## 2025-01-07 PROCEDURE — G8417 CALC BMI ABV UP PARAM F/U: HCPCS | Performed by: OTOLARYNGOLOGY

## 2025-01-07 PROCEDURE — 1123F ACP DISCUSS/DSCN MKR DOCD: CPT | Performed by: OTOLARYNGOLOGY

## 2025-01-07 PROCEDURE — 99214 OFFICE O/P EST MOD 30 MIN: CPT | Performed by: OTOLARYNGOLOGY

## 2025-01-07 PROCEDURE — 1036F TOBACCO NON-USER: CPT | Performed by: OTOLARYNGOLOGY

## 2025-01-07 PROCEDURE — M1300 PR FLU IMM NO ADMIN DOC REA: HCPCS | Performed by: OTOLARYNGOLOGY

## 2025-01-07 PROCEDURE — 31575 DIAGNOSTIC LARYNGOSCOPY: CPT | Performed by: OTOLARYNGOLOGY

## 2025-01-07 PROCEDURE — 1090F PRES/ABSN URINE INCON ASSESS: CPT | Performed by: OTOLARYNGOLOGY

## 2025-01-07 PROCEDURE — G8427 DOCREV CUR MEDS BY ELIG CLIN: HCPCS | Performed by: OTOLARYNGOLOGY

## 2025-01-07 PROCEDURE — G8400 PT W/DXA NO RESULTS DOC: HCPCS | Performed by: OTOLARYNGOLOGY

## 2025-01-07 PROCEDURE — 3017F COLORECTAL CA SCREEN DOC REV: CPT | Performed by: OTOLARYNGOLOGY

## 2025-01-07 NOTE — PROGRESS NOTES
from her op scca- she understood        I spent 30 minutes with the patients care and >50% of this time on counseling or coordinating care    Thank you for the opportunity to take part in the care of this very pleasant patient, Adrienne Dez  Sincerely,            Shyam Greene M.D., Ph.D., [facility]   Department of Otolaryngology-Head and Neck Surgery  Chief of Head & Neck Surgical Oncology  Director Head & Neck Oncology Service Line

## 2025-01-24 ENCOUNTER — EVALUATION (OUTPATIENT)
Dept: SPEECH THERAPY | Age: 75
End: 2025-01-24
Payer: MEDICARE

## 2025-01-24 DIAGNOSIS — R13.12 OROPHARYNGEAL DYSPHAGIA: Primary | ICD-10-CM

## 2025-01-24 PROCEDURE — 92610 EVALUATE SWALLOWING FUNCTION: CPT | Performed by: SPEECH-LANGUAGE PATHOLOGIST

## 2025-01-29 NOTE — PROGRESS NOTES
[facility] OUTPATIENT REHABILITATION  [facility]  Outpatient Speech Therapy  Phone: [phone]   Fax:  [phone]    SPEECH/LANGUAGE PATHOLOGY  CLINICAL ASSESSMENT OF SWALLOWING FUNCTION   and PLAN OF CARE:      PATIENT NAME:  Adrienne Garces  (female)     MRN:  [id number]    :  1950  (74 y.o.)  STATUS:  Outpatient clinic   TODAY'S DATE:  2025  REFERRING PROVIDER:    Dr. Shyam Greene       PROVIDER NPI:  [id number]  [facility]  Date of order:  2025  EVALUATING THERAPIST: Marie Keyes SLP    CERTIFICATION/RECERTIFICATION PERIOD: 2025 to 25  INSURANCE PROVIDER:  Payor: MEDICARE / Plan: MEDICARE PART A AND B / Product Type: *No Product type* /    INSURANCE ID:  [id number] - (Medicare)   SECONDARY INSURANCE (if applicable):      CPT Codes  EVALUATION: 99481  bedside swallow eval    TREATMENT:   To be determined after completing MBSS (Modified Barium Swallow Study)    REFERRING/TREATMENT DIAGNOSIS: Dysphonia [R49.0];Oropharyngeal dysphagia [R13.12];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]                   RESULTS:    DYSPHAGIA DIAGNOSIS:   Clinical indicators of mild-moderate oropharyngeal phase dysphagia  and unable to rule out silent aspiration bedside      DIET RECOMMENDATIONS: UNTIL RESULTS OF MBSS ARE RECEIVED:   Easy to chew consistency solids (IDDSI level 7, transitional) with  thin liquids (IDDSI level 0)     FEEDING RECOMMENDATIONS:     Assistance level:  No assistance needed      Compensatory strategies recommended: Effortful swallow  Slow rate of intake   SINGLE cup sips  SMALL bites  Liquid wash after thicker items to assist with clearing pharyngeal residue        SPEECH THERAPY  PLAN OF CARE   The dysphagia POC is established based on physician order, dysphagia diagnosis and results of clinical assessment     Will make further recommendations/changes to POC once MBSS is completed.    Conditions Requiring Skilled Therapeutic

## 2025-02-04 DIAGNOSIS — R13.12 OROPHARYNGEAL DYSPHAGIA: Primary | ICD-10-CM

## 2025-02-17 ENCOUNTER — HOSPITAL ENCOUNTER (OUTPATIENT)
Dept: GENERAL RADIOLOGY | Age: 75
Discharge: HOME | End: 2025-02-19
Attending: OTOLARYNGOLOGY
Payer: MEDICARE

## 2025-02-17 DIAGNOSIS — R13.12 OROPHARYNGEAL DYSPHAGIA: ICD-10-CM

## 2025-02-17 PROCEDURE — 2500000003 HC RX 250 WO HCPCS: Performed by: OTOLARYNGOLOGY

## 2025-02-17 PROCEDURE — 74230 X-RAY XM SWLNG FUNCJ C+: CPT

## 2025-02-17 RX ADMIN — BARIUM SULFATE 45 ML: 400 SUSPENSION ORAL at 10:49

## 2025-02-17 RX ADMIN — BARIUM SULFATE 45 G: 0.81 POWDER, FOR SUSPENSION ORAL at 10:49

## 2025-02-17 RX ADMIN — BARIUM SULFATE 45 ML: 400 PASTE ORAL at 10:49

## 2025-02-17 NOTE — PROGRESS NOTES
SPEECH/LANGUAGE PATHOLOGY  VIDEOFLUOROSCOPIC STUDY OF SWALLOWING (MBS)   and PLAN OF CARE    PATIENT NAME:  Adrienne Garces  (female)     MRN:  [id number]    :  1950  (74 y.o.)  STATUS:  Inpatient: Room Room/bed info not found    TODAY'S DATE:  2025  ORDER DATE, DESCRIPTION AND REFERRING PROVIDER:  Dr. Greene : FL MODIFIED BARIUM SWALLOW W VIDEO :  25  REASON FOR REFERRAL: dysphagia    EVALUATING THERAPIST: Miri Combs, SLP      RESULTS:      DYSPHAGIA DIAGNOSIS:  functional oropharyngeal swallow for age/premorbid functioning    DIET RECOMMENDATIONS:  Easy to chew consistency solids (IDDSI level 7, transitional) with  thin liquids (IDDSI level 0)    FEEDING RECOMMENDATIONS:    Assistance level:  No assistance needed     Compensatory strategies recommended: Incorporate liquids into solids during mastication before initiating A-P propulsion  Liquid wash to help clear oral cavity of thicker consistency items     Discussed recommendations with:  report sent to referring provider and Patient     Laryngeal Penetration and Aspiration:  Penetration WITHOUT aspiration was observed in today's study with  thin liquid x1 with good response/throat clear     SPEECH THERAPY  PLAN OF CARE   The dysphagia POC is established based on physician order and dysphagia diagnosis    Outpatient speech therapy  intervention for dysphagia management is recommended to address the established treatment plan frequency and duration at the discretion of treating SLP      Conditions Requiring Skilled Therapeutic Intervention for dysphagia:    delayed or slow lingual motion--this resulted in penetration with thin due to her hesitation  oral residue   swallow triggered once bolus head entered the valleculae    SPECIFIC DYSPHAGIA INTERVENTIONS TO INCLUDE:     compensatory swallowing strategies to improve airway protection and swallow function.    Specific instructions for next treatment:  development and training of compensatory

## 2025-02-25 ENCOUNTER — OFFICE VISIT (OUTPATIENT)
Dept: ENT CLINIC | Age: 75
End: 2025-02-25
Payer: MEDICARE

## 2025-02-25 VITALS
DIASTOLIC BLOOD PRESSURE: 83 MMHG | SYSTOLIC BLOOD PRESSURE: 140 MMHG | HEART RATE: 64 BPM | HEIGHT: 60 IN | BODY MASS INDEX: 32.76 KG/M2 | WEIGHT: 166.9 LBS

## 2025-02-25 DIAGNOSIS — R13.12 OROPHARYNGEAL DYSPHAGIA: ICD-10-CM

## 2025-02-25 DIAGNOSIS — T66.XXXD RADIATION INJURY, SUBSEQUENT ENCOUNTER: ICD-10-CM

## 2025-02-25 DIAGNOSIS — I89.0 LYMPHEDEMA: ICD-10-CM

## 2025-02-25 DIAGNOSIS — C10.9 OROPHARYNX CANCER (HCC): Primary | ICD-10-CM

## 2025-02-25 PROCEDURE — G8428 CUR MEDS NOT DOCUMENT: HCPCS | Performed by: OTOLARYNGOLOGY

## 2025-02-25 PROCEDURE — G8417 CALC BMI ABV UP PARAM F/U: HCPCS | Performed by: OTOLARYNGOLOGY

## 2025-02-25 PROCEDURE — G8400 PT W/DXA NO RESULTS DOC: HCPCS | Performed by: OTOLARYNGOLOGY

## 2025-02-25 PROCEDURE — 3017F COLORECTAL CA SCREEN DOC REV: CPT | Performed by: OTOLARYNGOLOGY

## 2025-02-25 PROCEDURE — 99214 OFFICE O/P EST MOD 30 MIN: CPT | Performed by: OTOLARYNGOLOGY

## 2025-02-25 PROCEDURE — 1090F PRES/ABSN URINE INCON ASSESS: CPT | Performed by: OTOLARYNGOLOGY

## 2025-02-25 PROCEDURE — 1123F ACP DISCUSS/DSCN MKR DOCD: CPT | Performed by: OTOLARYNGOLOGY

## 2025-02-25 PROCEDURE — 1036F TOBACCO NON-USER: CPT | Performed by: OTOLARYNGOLOGY

## 2025-02-25 NOTE — PROGRESS NOTES
Dear Zachariah Prasad, APRN - CNP No ref. provider found     We had the pleasure of seeing Adrienne Garces, 1950 here    on 2/25/2025  Please see below for review of care and plans.     Chief complaint- No diagnosis found.      Chemo xrt at [facility] may 2024    11/5/24: Pt here for . 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 Romario packer 03/24.  Patient is in no pain. No issues swallowing. Patients voice is Horse. Patient is have more phlepm then normal. Drinks 24oz of water, 1 iced coffee of caffeine, and does not drink alcohol. Weight 167lb patient started at 203lbs,  Patient states that she has lost more weight since chemo has stopped. Finished Chemo at the [facility] May 16th, Received 33 sessions of XRT at [facility] (start 4/11/24 and finished 06/3/24).  Repeat PET CT in 09/2024 showed interval decreased uptake of previous right level IIA mass. Takes the Synthroid but family doctor has been tweaking it.     01/7/2025  Pt here for Surveillance Squamous cell carcinoma of skin of scalp and neck. No pain. Does have some issues swallowing, feels as though she is choking. Voice is raspy and has some phelm issues. Drinks 48oz of water, 1-2 cups tea caffeine, and 2-3 in a month alcohol. Weight 168lbs and is stable. No aspiration but tough getting food down.    02/25/25  Pt here for Surveillance Squamous cell carcinoma of skin of scalp and neck. Does not want to have scope done today and is refusing it. No pain. Had swallow study done and was given advice and is doing better with swallowing. . Voice is raspy and has some phelm issues in the morning only. Drinks 48oz of water, 1-2 cups tea caffeine, and 2-3 in a month alcohol. Weight 166lbs and is stable.     Review of Systems- No drainage, discharge, or headache.  Complete 10 system ROS completed and negative except as noted above.     Physical Examination-   Vital

## 2025-02-28 ENCOUNTER — HOSPITAL ENCOUNTER (OUTPATIENT)
Dept: OCCUPATIONAL THERAPY | Age: 75
Setting detail: THERAPIES SERIES
Discharge: HOME | End: 2025-02-28

## 2025-02-28 ENCOUNTER — TELEPHONE (OUTPATIENT)
Dept: SURGERY | Age: 75
End: 2025-02-28

## 2025-02-28 DIAGNOSIS — Z95.828 PORT-A-CATH IN PLACE: ICD-10-CM

## 2025-02-28 NOTE — PROGRESS NOTES
Occupational Therapy      OCCUPATIONAL THERAPY INITIAL LYMPHEDEMA EVALUATION    [place] [address] [facility]  [address]   Phone: [phone]  Fax: [phone]     Date:  2025  Initial Evaluation Date: 2025     Evaluating Therapist: VALERIANO Giron/L, CLT-KARENA    Patient Name:  Adrienne Garces    :  1950    Restrictions/Precautions:   fall risk  Diagnosis:  Lymphedema (I89.0)       Date of Surgery/Injury: n/a    Insurance/Certification information:  Medicare Part A and B               [id number]  Plan of care signed (Y/N): N  Visit# / total visits: Evaluation    Referring Practitioner:  Shyam Greene MD                       NPI:  [id number]  Specific Practitioner Orders: Evaluation and Treatment    Assessment of current deficits   []Pain  []Skin Integrity   [x]Lymphedema   []Functional transfers/mobility   []ADLs   []Strength    []Cognition  []IADLs   []Safety Awareness   []  Motor Endurance    []Fine Motor Coordination   []Balance   []Vision/perception  []Sensation []Gross Motor Coordination  []ROM     OT PLAN OF CARE   OT POC based on physician orders, patient diagnosis and results of clinical assessment    Frequency/Duration:  1-2x per week for 12 treatment sessions from 2025 through 2025  Specific OT Treatment to include:     Plan of Care:     [x]97140-Manual Lymph Drainage and Combined Decongestive Therapy  [x]43039- Skilled Multilayer Short Stretch Compression Bandaging/ Therapeutic Exercise  [x]Skin Care Education [x]HEP including Self MLD Education and/or Self Bandaging  [x]Education for Lymphedema Risks/Precautions     [x] Caregiver Education   []other:      Patient Specific Goal: so my face does not look as swollen      STG: 3 weeks   Patient will demonstrate knowledge and understanding for lymphedema precautions, skin care and self management to decrease progression of lymphedema and risk of infection.  2.  Patient will present

## 2025-02-28 NOTE — TELEPHONE ENCOUNTER
Attempted to reach patient to schedule mediport removal with Dr. Blanco. VM left.   Electronically signed by Kaelyn Saucedo RN on 2/28/2025 at 9:51 AM    Patient returned phone call.    Per Dr. Blanco, patient scheduled for Mediport Insertion at [facility] on 3/31/25. Surgery scheduled via [facility], surgeon's calendar updated. Follow up as needed. Dr. Blanco to enter orders.   Electronically signed by Kaelyn Saucedo RN on 2/28/2025 at 9:56 AM

## 2025-03-05 ENCOUNTER — HOSPITAL ENCOUNTER (OUTPATIENT)
Dept: OCCUPATIONAL THERAPY | Age: 75
Setting detail: THERAPIES SERIES
Discharge: HOME | End: 2025-03-05
Payer: MEDICARE

## 2025-03-05 PROCEDURE — 97535 SELF CARE MNGMENT TRAINING: CPT | Performed by: OCCUPATIONAL THERAPIST

## 2025-03-05 PROCEDURE — 97140 MANUAL THERAPY 1/> REGIONS: CPT | Performed by: OCCUPATIONAL THERAPIST

## 2025-03-05 NOTE — PROGRESS NOTES
Occupational Therapy        OCCUPATIONAL THERAPY PROGRESS NOTE  [place] [address] [facility]  [address]              Phone: [phone]             Fax: [phone]     Date:  3/5/2025  Initial Evaluation Date: 2025     Evaluating Therapist: VALERIANO Giron/L, CLT-KARENA    Patient Name:  Adrienne Garces    :  1950    Restrictions/Precautions:   fall risk  Diagnosis:  Lymphedema (I89.0)        Date of Surgery/Injury: n/a    Insurance/Certification information:   Medicare Part A and B               [id number]   Plan of care signed (Y/N): N  Visit# / total visits: Evaluation    Referring Practitioner:  Shyam Greene MD    NPI: [id number]   Specific Practitioner Orders: Evaluation and Treatment    Assessment of current deficits   []Pain  []Skin Integrity   [x]Lymphedema   []Functional transfers/mobility   []ADLs   []Strength    []Cognition  []IADLs   []Safety Awareness   []  Motor Endurance    []Fine Motor Coordination   []Balance   []Vision/perception  []Sensation []Gross Motor Coordination  []ROM     OT PLAN OF CARE   OT POC based on physician orders, patient diagnosis and results of clinical assessment    Frequency/Duration:  1-2x per week for 12 treatment sessions from 2025 through 2025   Specific OT Treatment to include:     Plan of Care:     [x]97140-Manual Lymph Drainage and Combined Decongestive Therapy  [x]97617- Skilled Multilayer Short Stretch Compression Bandaging/ Therapeutic Exercise  [x]Skin Care Education [x]HEP including Self MLD Education and/or Self Bandaging  [x]Education for Lymphedema Risks/Precautions     [x] Caregiver Education   []other:      Patient Specific Goal: so my face does not look as swollen     STG: 3 weeks   Patient will demonstrate knowledge and understanding for lymphedema precautions, skin care and self management to decrease progression of lymphedema and risk of infection.  Therapist initiated patient

## 2025-03-27 ENCOUNTER — ANESTHESIA EVENT (OUTPATIENT)
Dept: OPERATING ROOM | Age: 75
End: 2025-03-27
Payer: MEDICARE

## 2025-03-27 ENCOUNTER — HOSPITAL ENCOUNTER (OUTPATIENT)
Dept: PREADMISSION TESTING | Age: 75
Discharge: HOME | End: 2025-03-27
Payer: MEDICARE

## 2025-03-27 VITALS
HEIGHT: 60 IN | BODY MASS INDEX: 33.38 KG/M2 | TEMPERATURE: 97.5 F | WEIGHT: 170 LBS | SYSTOLIC BLOOD PRESSURE: 153 MMHG | HEART RATE: 60 BPM | RESPIRATION RATE: 18 BRPM | DIASTOLIC BLOOD PRESSURE: 79 MMHG | OXYGEN SATURATION: 96 %

## 2025-03-27 DIAGNOSIS — Z95.828 PORT-A-CATH IN PLACE: Primary | ICD-10-CM

## 2025-03-27 LAB
ANION GAP SERPL CALCULATED.3IONS-SCNC: 9 MMOL/L (ref 7–16)
BASOPHILS # BLD: 0.04 K/UL (ref 0–0.2)
BASOPHILS NFR BLD: 1 % (ref 0–2)
BUN SERPL-MCNC: 22 MG/DL (ref 6–23)
CALCIUM SERPL-MCNC: 9.5 MG/DL (ref 8.6–10.2)
CHLORIDE SERPL-SCNC: 106 MMOL/L (ref 98–107)
CO2 SERPL-SCNC: 27 MMOL/L (ref 22–29)
CREAT SERPL-MCNC: 0.7 MG/DL (ref 0.5–1)
EKG ATRIAL RATE: 54 BPM
EKG P AXIS: 48 DEGREES
EKG P-R INTERVAL: 180 MS
EKG Q-T INTERVAL: 482 MS
EKG QRS DURATION: 86 MS
EKG QTC CALCULATION (BAZETT): 457 MS
EKG R AXIS: 1 DEGREES
EKG T AXIS: 70 DEGREES
EKG VENTRICULAR RATE: 54 BPM
EOSINOPHIL # BLD: 0.07 K/UL (ref 0.05–0.5)
EOSINOPHILS RELATIVE PERCENT: 2 % (ref 0–6)
ERYTHROCYTE [DISTWIDTH] IN BLOOD BY AUTOMATED COUNT: 13.5 % (ref 11.5–15)
GFR, ESTIMATED: 85 ML/MIN/1.73M2
GLUCOSE SERPL-MCNC: 90 MG/DL (ref 74–99)
HCT VFR BLD AUTO: 38.1 % (ref 34–48)
HGB BLD-MCNC: 12.4 G/DL (ref 11.5–15.5)
IMM GRANULOCYTES # BLD AUTO: <0.03 K/UL (ref 0–0.58)
IMM GRANULOCYTES NFR BLD: 0 % (ref 0–5)
LYMPHOCYTES NFR BLD: 0.69 K/UL (ref 1.5–4)
LYMPHOCYTES RELATIVE PERCENT: 20 % (ref 20–42)
MCH RBC QN AUTO: 29.7 PG (ref 26–35)
MCHC RBC AUTO-ENTMCNC: 32.5 G/DL (ref 32–34.5)
MCV RBC AUTO: 91.4 FL (ref 80–99.9)
MONOCYTES NFR BLD: 0.23 K/UL (ref 0.1–0.95)
MONOCYTES NFR BLD: 7 % (ref 2–12)
NEUTROPHILS NFR BLD: 70 % (ref 43–80)
NEUTS SEG NFR BLD: 2.39 K/UL (ref 1.8–7.3)
PLATELET # BLD AUTO: 166 K/UL (ref 130–450)
PMV BLD AUTO: 9.4 FL (ref 7–12)
POTASSIUM SERPL-SCNC: 3.8 MMOL/L (ref 3.5–5)
RBC # BLD AUTO: 4.17 M/UL (ref 3.5–5.5)
SODIUM SERPL-SCNC: 142 MMOL/L (ref 132–146)
WBC OTHER # BLD: 3.4 K/UL (ref 4.5–11.5)

## 2025-03-27 PROCEDURE — 85025 COMPLETE CBC W/AUTO DIFF WBC: CPT

## 2025-03-27 PROCEDURE — 93005 ELECTROCARDIOGRAM TRACING: CPT | Performed by: ANESTHESIOLOGY

## 2025-03-27 PROCEDURE — 80048 BASIC METABOLIC PNL TOTAL CA: CPT

## 2025-03-27 NOTE — PROGRESS NOTES
Dr mcfarland reviewed EKG, EKG ok per dr mcfarland. Faxed cbcdiff to dr miller and dr mclain office, confirmation received.  
cancelled if you do not have a ride home or someone to stay with you.      Please wear simple, loose fitting clothing to the hospital.  Do not bring valuables (money, credit cards, checkbooks, etc.) Do not wear any makeup (including no eye makeup) or nail polish on your fingers or toes.    DO NOT wear any jewelry or piercings on day of surgery.  All body piercings and jewelry must be removed.    Shower the MORNING of surgery with an Antibacterial soap.   wipes      If you have a Living Will and/or Durable Power of  for Healthcare, please bring in a copy.    If appropriate bring crutches, inspirex, walker, cane etc...    Notify your Surgeon if you develop any illness between now and surgery time, cough, cold, fever, sore throat, nausea, vomiting, etc.  Please notify your surgeon if you experience dizziness, shortness of breath or blurred vision between now & the time of your surgery.    If you wear dentures, they will need to be removed before going into surgery, we will provide you with a container. If you wear contact lenses or glasses, they will need to be removed, please bring a case for them.    To provide excellent care visitors will be limited to 1 person in the room at any given time.                                                                                         No children under the age of 16 are permitted in the hospital for the safety of all patients.     Any implantable device requiring remote therapy, Please bring remote day of surgery and bring your implant card with you!      21. If you use a C-PAP please bring settings with you, do not bring the machine.    22. Other:                     Please call AMBULATORY CARE if you have any further questions.   Pre Admit Testing           [phone]     [facility] [phone]

## 2025-03-28 ENCOUNTER — HOSPITAL ENCOUNTER (OUTPATIENT)
Dept: OCCUPATIONAL THERAPY | Age: 75
Setting detail: THERAPIES SERIES
Discharge: HOME | End: 2025-03-28
Payer: MEDICARE

## 2025-03-28 NOTE — PROGRESS NOTES
Occupational Therapy          [facility]  [facility] OCCUPATIONAL THERAP[facility]  [address] [facility] [address]  Dept: [phone]  Loc: [phone]   [facility] OT fax [phone]    Cancellation/No Show Note      Date:  3/28/2025    Patient Name:  Adrienne Garces     :  1950         PT ID: [id number]    Total missed visits including today: 0       Total number of no shows: 0    For today's appointment patient:   [x]  Cancelled   []  Rescheduled appointment   []  No-show     Reason given by patient:   []  Patient ill   []  Conflicting appointment   []  No transportation   []  Conflict with work   [x]  No reason given   []  Other:    Comments:                    Comments:  patient next appointment scheduled    Electronically signed by:  VALERIANO Giron/L, CLT-KARENA

## 2025-03-31 ENCOUNTER — HOSPITAL ENCOUNTER (OUTPATIENT)
Age: 75
Setting detail: OUTPATIENT SURGERY
Discharge: HOME | End: 2025-03-31
Attending: SURGERY | Admitting: SURGERY
Payer: MEDICARE

## 2025-03-31 ENCOUNTER — ANESTHESIA (OUTPATIENT)
Dept: OPERATING ROOM | Age: 75
End: 2025-03-31
Payer: MEDICARE

## 2025-03-31 VITALS
HEART RATE: 57 BPM | DIASTOLIC BLOOD PRESSURE: 67 MMHG | TEMPERATURE: 97.2 F | OXYGEN SATURATION: 95 % | WEIGHT: 170 LBS | HEIGHT: 60 IN | RESPIRATION RATE: 15 BRPM | SYSTOLIC BLOOD PRESSURE: 146 MMHG | BODY MASS INDEX: 33.38 KG/M2

## 2025-03-31 DIAGNOSIS — G89.18 POSTOPERATIVE PAIN: Primary | ICD-10-CM

## 2025-03-31 PROCEDURE — 36590 REMOVAL TUNNELED CV CATH: CPT | Performed by: SURGERY

## 2025-03-31 PROCEDURE — 3700000000 HC ANESTHESIA ATTENDED CARE: Performed by: SURGERY

## 2025-03-31 PROCEDURE — 7100000010 HC PHASE II RECOVERY - FIRST 15 MIN: Performed by: SURGERY

## 2025-03-31 PROCEDURE — 2709999900 HC NON-CHARGEABLE SUPPLY: Performed by: SURGERY

## 2025-03-31 PROCEDURE — 2500000003 HC RX 250 WO HCPCS: Performed by: SURGERY

## 2025-03-31 PROCEDURE — 3600000012 HC SURGERY LEVEL 2 ADDTL 15MIN: Performed by: SURGERY

## 2025-03-31 PROCEDURE — 3600000002 HC SURGERY LEVEL 2 BASE: Performed by: SURGERY

## 2025-03-31 PROCEDURE — 2580000003 HC RX 258

## 2025-03-31 PROCEDURE — 2580000003 HC RX 258: Performed by: NURSE ANESTHETIST, CERTIFIED REGISTERED

## 2025-03-31 PROCEDURE — 6360000002 HC RX W HCPCS

## 2025-03-31 PROCEDURE — 6360000002 HC RX W HCPCS: Performed by: NURSE ANESTHETIST, CERTIFIED REGISTERED

## 2025-03-31 PROCEDURE — 3700000001 HC ADD 15 MINUTES (ANESTHESIA): Performed by: SURGERY

## 2025-03-31 PROCEDURE — 6360000002 HC RX W HCPCS: Performed by: SURGERY

## 2025-03-31 PROCEDURE — 7100000011 HC PHASE II RECOVERY - ADDTL 15 MIN: Performed by: SURGERY

## 2025-03-31 RX ORDER — TRAMADOL HYDROCHLORIDE 50 MG/1
50 TABLET ORAL EVERY 4 HOURS PRN
Qty: 6 TABLET | Refills: 0 | Status: SHIPPED | OUTPATIENT
Start: 2025-03-31 | End: 2025-04-03

## 2025-03-31 RX ORDER — SODIUM CHLORIDE 0.9 % (FLUSH) 0.9 %
5-40 SYRINGE (ML) INJECTION EVERY 12 HOURS SCHEDULED
Status: DISCONTINUED | OUTPATIENT
Start: 2025-03-31 | End: 2025-03-31 | Stop reason: HOSPADM

## 2025-03-31 RX ORDER — LIDOCAINE HYDROCHLORIDE 10 MG/ML
INJECTION, SOLUTION EPIDURAL; INFILTRATION; INTRACAUDAL; PERINEURAL PRN
Status: DISCONTINUED | OUTPATIENT
Start: 2025-03-31 | End: 2025-03-31 | Stop reason: ALTCHOICE

## 2025-03-31 RX ORDER — CIPROFLOXACIN 2 MG/ML
400 INJECTION, SOLUTION INTRAVENOUS
Status: COMPLETED | OUTPATIENT
Start: 2025-03-31 | End: 2025-03-31

## 2025-03-31 RX ORDER — FENTANYL CITRATE 50 UG/ML
25 INJECTION, SOLUTION INTRAMUSCULAR; INTRAVENOUS EVERY 5 MIN PRN
Refills: 0 | Status: CANCELLED | OUTPATIENT
Start: 2025-03-31

## 2025-03-31 RX ORDER — SODIUM CHLORIDE, SODIUM LACTATE, POTASSIUM CHLORIDE, CALCIUM CHLORIDE 600; 310; 30; 20 MG/100ML; MG/100ML; MG/100ML; MG/100ML
INJECTION, SOLUTION INTRAVENOUS
Status: DISCONTINUED | OUTPATIENT
Start: 2025-03-31 | End: 2025-03-31 | Stop reason: SDUPTHER

## 2025-03-31 RX ORDER — SODIUM CHLORIDE 0.9 % (FLUSH) 0.9 %
5-40 SYRINGE (ML) INJECTION PRN
Status: DISCONTINUED | OUTPATIENT
Start: 2025-03-31 | End: 2025-03-31 | Stop reason: HOSPADM

## 2025-03-31 RX ORDER — FENTANYL CITRATE 50 UG/ML
INJECTION, SOLUTION INTRAMUSCULAR; INTRAVENOUS
Status: DISCONTINUED | OUTPATIENT
Start: 2025-03-31 | End: 2025-03-31 | Stop reason: SDUPTHER

## 2025-03-31 RX ORDER — SODIUM CHLORIDE 9 MG/ML
INJECTION, SOLUTION INTRAVENOUS PRN
Status: DISCONTINUED | OUTPATIENT
Start: 2025-03-31 | End: 2025-03-31 | Stop reason: HOSPADM

## 2025-03-31 RX ORDER — ONDANSETRON 2 MG/ML
4 INJECTION INTRAMUSCULAR; INTRAVENOUS
Status: CANCELLED | OUTPATIENT
Start: 2025-03-31

## 2025-03-31 RX ORDER — HYDRALAZINE HYDROCHLORIDE 20 MG/ML
10 INJECTION INTRAMUSCULAR; INTRAVENOUS
Status: CANCELLED | OUTPATIENT
Start: 2025-03-31

## 2025-03-31 RX ORDER — LIDOCAINE HYDROCHLORIDE 20 MG/ML
INJECTION, SOLUTION INTRAVENOUS
Status: DISCONTINUED | OUTPATIENT
Start: 2025-03-31 | End: 2025-03-31 | Stop reason: SDUPTHER

## 2025-03-31 RX ORDER — MIDAZOLAM HYDROCHLORIDE 1 MG/ML
2 INJECTION, SOLUTION INTRAMUSCULAR; INTRAVENOUS
Status: CANCELLED | OUTPATIENT
Start: 2025-03-31

## 2025-03-31 RX ORDER — SODIUM CHLORIDE 9 MG/ML
INJECTION, SOLUTION INTRAVENOUS CONTINUOUS
Status: CANCELLED | OUTPATIENT
Start: 2025-03-31

## 2025-03-31 RX ORDER — PROPOFOL 10 MG/ML
INJECTION, EMULSION INTRAVENOUS
Status: DISCONTINUED | OUTPATIENT
Start: 2025-03-31 | End: 2025-03-31 | Stop reason: SDUPTHER

## 2025-03-31 RX ORDER — PROCHLORPERAZINE EDISYLATE 5 MG/ML
5 INJECTION INTRAMUSCULAR; INTRAVENOUS
Status: CANCELLED | OUTPATIENT
Start: 2025-03-31

## 2025-03-31 RX ORDER — BUPIVACAINE HYDROCHLORIDE AND EPINEPHRINE 2.5; 5 MG/ML; UG/ML
INJECTION, SOLUTION EPIDURAL; INFILTRATION; INTRACAUDAL; PERINEURAL PRN
Status: DISCONTINUED | OUTPATIENT
Start: 2025-03-31 | End: 2025-03-31 | Stop reason: ALTCHOICE

## 2025-03-31 RX ORDER — NALOXONE HYDROCHLORIDE 0.4 MG/ML
INJECTION, SOLUTION INTRAMUSCULAR; INTRAVENOUS; SUBCUTANEOUS PRN
Status: CANCELLED | OUTPATIENT
Start: 2025-03-31

## 2025-03-31 RX ORDER — SODIUM CHLORIDE, SODIUM LACTATE, POTASSIUM CHLORIDE, CALCIUM CHLORIDE 600; 310; 30; 20 MG/100ML; MG/100ML; MG/100ML; MG/100ML
INJECTION, SOLUTION INTRAVENOUS CONTINUOUS
Status: DISCONTINUED | OUTPATIENT
Start: 2025-03-31 | End: 2025-03-31 | Stop reason: HOSPADM

## 2025-03-31 RX ORDER — LABETALOL HYDROCHLORIDE 5 MG/ML
10 INJECTION, SOLUTION INTRAVENOUS
Status: CANCELLED | OUTPATIENT
Start: 2025-03-31

## 2025-03-31 RX ORDER — MEPERIDINE HYDROCHLORIDE 25 MG/ML
12.5 INJECTION INTRAMUSCULAR; INTRAVENOUS; SUBCUTANEOUS EVERY 5 MIN PRN
Refills: 0 | Status: CANCELLED | OUTPATIENT
Start: 2025-03-31

## 2025-03-31 RX ADMIN — FENTANYL CITRATE 50 MCG: 50 INJECTION, SOLUTION INTRAMUSCULAR; INTRAVENOUS at 08:30

## 2025-03-31 RX ADMIN — SODIUM CHLORIDE, SODIUM LACTATE, POTASSIUM CHLORIDE, AND CALCIUM CHLORIDE: .6; .31; .03; .02 INJECTION, SOLUTION INTRAVENOUS at 07:24

## 2025-03-31 RX ADMIN — CIPROFLOXACIN 400 MG: 2 INJECTION, SOLUTION INTRAVENOUS at 08:22

## 2025-03-31 RX ADMIN — FENTANYL CITRATE 50 MCG: 50 INJECTION, SOLUTION INTRAMUSCULAR; INTRAVENOUS at 08:26

## 2025-03-31 RX ADMIN — SODIUM CHLORIDE, POTASSIUM CHLORIDE, SODIUM LACTATE AND CALCIUM CHLORIDE: 600; 310; 30; 20 INJECTION, SOLUTION INTRAVENOUS at 07:44

## 2025-03-31 RX ADMIN — PROPOFOL INJECTABLE EMULSION 50 MCG/KG/MIN: 10 INJECTION, EMULSION INTRAVENOUS at 08:26

## 2025-03-31 RX ADMIN — LIDOCAINE HYDROCHLORIDE 50 MG: 20 INJECTION, SOLUTION INTRAVENOUS at 08:26

## 2025-03-31 ASSESSMENT — PAIN - FUNCTIONAL ASSESSMENT
PAIN_FUNCTIONAL_ASSESSMENT: 0-10
PAIN_FUNCTIONAL_ASSESSMENT: NONE - DENIES PAIN
PAIN_FUNCTIONAL_ASSESSMENT: NONE - DENIES PAIN

## 2025-03-31 NOTE — ANESTHESIA PRE PROCEDURE
History:        Diagnosis Date   • Arthritis    • Cancer (HCC)    • Hypertension    • Hypothyroidism    • Stomach ulcer        Past Surgical History:        Procedure Laterality Date   • COLONOSCOPY  2022    Dr. Rosen /  Colon polyp   • ESOPHAGOGASTRODUODENOSCOPY  2022    Dr. Rosen / History of Ulcers   • LARYNGOSCOPY  2024    Dr. Doss   • PORT SURGERY N/A 4/8/2024    EGD PEG TUBE INSERTION AND MEDIPORT INSERTION [61507 + 28696] performed by Adrian Blanco MD at [facility] OR   • TOTAL KNEE ARTHROPLASTY Right 10/2023   • TUBAL LIGATION  1984   • UPPER GASTROINTESTINAL ENDOSCOPY N/A 4/8/2024    ESOPHAGOGASTRODUODENOSCOPY PERCUTANEOUS ENDOSCOPIC GASTROSTOMY TUBE PLACEMENT performed by Adrian Blanco MD at [facility] OR       Social History:    Social History     Tobacco Use   • Smoking status: Never   • Smokeless tobacco: Never   Substance Use Topics   • Alcohol use: Yes     Alcohol/week: 1.0 standard drink of alcohol     Types: 1 Glasses of wine per week     Comment: 1-2 drinks a week mixed drink                                Counseling given: Not Answered      Vital Signs (Current):   Vitals:    03/31/25 0719   BP: (!) 164/74   Pulse: 68   Resp: 16   Temp: 97.5 °F (36.4 °C)   TempSrc: Temporal   SpO2: 98%   Weight: 77.1 kg (170 lb)   Height: 1.524 m (5')                                              BP Readings from Last 3 Encounters:   03/31/25 (!) 164/74   03/27/25 (!) 153/79   02/25/25 (!) 140/83       NPO Status: Time of last liquid consumption: 0530                        Time of last solid consumption: 1730                        Date of last liquid consumption: 03/31/25                        Date of last solid food consumption: 03/30/25    BMI:   Wt Readings from Last 3 Encounters:   03/31/25 77.1 kg (170 lb)   03/27/25 77.1 kg (170 lb)   02/25/25 75.7 kg (166 lb 14.4 oz)     Body mass index is 33.2 kg/m².    CBC:   Lab Results   Component Value Date/Time    WBC 3.4 03/27/2025 07:30 AM    RBC 4.17

## 2025-03-31 NOTE — H&P
General Surgery History and Physical  [facility]    Patient's Name/Date of Birth: Adrienne Garces / 1950    Date: March 31, 2025     Surgeon: Adrian Blanco M.D.    PCP: Zachariah Randolph APRN - CNP     Chief Complaint: Removal of central venous access    HPI:   Adrienne Garces is a 74 y.o. female who presents for evaluation of removal tunneled central venous catheter. Patient has completed treatment for oral cancer. They are no longer in need of their central venous catheter and subcutaneous port. They wish to have it removed.    Patient Active Problem List   Diagnosis    Poor venous access    Head and neck cancer (HCC)    Decreased oral intake    Port-A-Cath in place       Past Medical History:   Diagnosis Date    Arthritis     Cancer (HCC)     Hypertension     Hypothyroidism     Stomach ulcer        Past Surgical History:   Procedure Laterality Date    COLONOSCOPY  2022    Dr. Rosen /  Colon polyp    ESOPHAGOGASTRODUODENOSCOPY  2022    Dr. Rosen / History of Ulcers    LARYNGOSCOPY  2024    Dr. Doss    PORT SURGERY N/A 4/8/2024    EGD PEG TUBE INSERTION AND MEDIPORT INSERTION [24581 + 26786] performed by Adrian Blanco MD at [facility] OR    TOTAL KNEE ARTHROPLASTY Right 10/2023    TUBAL LIGATION  1984    UPPER GASTROINTESTINAL ENDOSCOPY N/A 4/8/2024    ESOPHAGOGASTRODUODENOSCOPY PERCUTANEOUS ENDOSCOPIC GASTROSTOMY TUBE PLACEMENT performed by Adrian Blanco MD at [facility] OR       Allergies   Allergen Reactions    Escitalopram Other (See Comments)     Lower extremity edema     Ginkgo Biloba Rash    Keflex [Cephalexin] Rash    Penicillins Rash    Sulfa Antibiotics Rash       The patient has a family history that is negative for severe cardiovascular or respiratory issues, negative for reaction to anesthesia.    Social History     Socioeconomic History    Marital status:      Spouse name: Not on file    Number of children: Not on file    Years of education: Not on file

## 2025-03-31 NOTE — ANESTHESIA POSTPROCEDURE EVALUATION
Department of Anesthesiology  Postprocedure Note    Patient: Adrienne Garces  MRN: [id number]  YOB: 1950  Date of evaluation: 3/31/2025    Procedure Summary       Date: 03/31/25 Room / Location: [address]    Anesthesia Start: 0812 Anesthesia Stop: 0839    Procedure: MEDIPORT REMOVAL (Abdomen) Diagnosis:       Port-A-Cath in place      (Port-A-Cath in place [Z95.828])    Surgeons: Adrian Blanco MD Responsible Provider: Martinez Stevens DO    Anesthesia Type: MAC ASA Status: 3            Anesthesia Type: No value filed.    Kvng Phase I: Kvng Score: 10    Kvng Phase II: Kvng Score: 10    Anesthesia Post Evaluation    Patient location during evaluation: PACU  Patient participation: complete - patient participated  Level of consciousness: awake and alert  Pain score: 0  Airway patency: patent  Nausea & Vomiting: no nausea and no vomiting  Cardiovascular status: blood pressure returned to baseline and hemodynamically stable  Respiratory status: acceptable  Hydration status: stable  Pain management: adequate    No notable events documented.

## 2025-03-31 NOTE — OP NOTE
Adrienne Garces  [facility]   Operative Report    DATE OF PROCEDURE: 3/31/2025    SURGEON: Dr. Adrian Blanco MD, MLIYAH.     ASSISTANT: MD David, Jennifer Lobato,  Assist    PREOPERATIVE DIAGNOSIS: Venous insufficiency (I87.2); Mediport in place. History oral cancer    POSTOPERATIVE DIAGNOSIS: Same.     OPERATION: Removal Central Venous Catheter and Subcutaneous Port.(14939)     ANESTHESIA: LMAC     ESTIMATED BLOOD LOSS: None.     COMPLICATIONS: None.     HISTORY: Adrienne Garces is a  74 y.o.  female who has poor venous access, mediport in place and no longer needs it.    OPERATION: The patient was placed on the table in a supine position. She  was given IV preop. SCD's were placed on the legs as DVT prophylaxis.  The skin was prepped with ChloroPrep and draped in the usual fashion.  The skin was numbed with marcaine plus epinephrine. An incision was made though the old scar above the port. Cautery was used to dissect down around the port and it was freed from the attachments and removed and sent off the field. The catheter tunnel was closed with vicryl suture. The dermis was closed with 3-0 vicryl. Surgical glue was placed on the incision, no dressing. The needle and sponge count were correct. The patient tolerated the procedure well and went to recovery in stable condition.    Physician Signature: Electronically signed by Dr. Adrian Blanco MD, M.D.

## 2025-03-31 NOTE — DISCHARGE INSTRUCTIONS
Patient Discharge Instructions [facility]  Adrian Blanco MD, MS  Discharge Date:  3/31/2025    Discharged To:  Home    RESUME ACTIVITY:      BATHING:  May shower 24hrs after surgery, remove dressings after 24hrs if in place, leave steristrips in place as they will fall of independently. You may have adhesive glue covering your incisions which will dissolve on it own.  May bathe or swim 5 days after surgery    DRIVING: No driving while on pain medications    RETURN TO WORK: [employer]    WALKING:  Yes, encouraged    SEXUAL ACTIVITY: Yes    STAIRS:  Yes    LIFTING: [employer]    DIET: General adult    SPECIAL INSTRUCTIONS:     Call physician if they or any other problems occur:  Fever over 101°    Redness, swelling, hardness or warmth at the operative site  Unrelieved nausea    Foul smelling or cloudy drainage at the operative site   Unrelieved pain    Blood soaked dressing. (Some oozing may be normal)    Call office for follow up appointment with Dr Blanco as needed. Call with questions regarding follow up.    Call the office at [phone] if you have a fever > 100 F, or if your incision becomes red, tender, or drains more than a small amount of clear fluid.    Keep incisions clean and dry.  Vicodin/Percocet and ibuprofen for pain as prescribed. Okay to resume anticoagulant medication after 24hrs.      Do not exceed 4000mg of Tylenol/Acetaminophen per day. Vicodin/Norco/Percocet contain acetaminophen. Do not take additional amounts of Tylenol if you are taking these medications.

## 2025-04-02 ENCOUNTER — APPOINTMENT (OUTPATIENT)
Dept: OCCUPATIONAL THERAPY | Age: 75
End: 2025-04-02
Payer: MEDICARE

## 2025-04-09 ENCOUNTER — HOSPITAL ENCOUNTER (OUTPATIENT)
Dept: OCCUPATIONAL THERAPY | Age: 75
Setting detail: THERAPIES SERIES
Discharge: HOME | End: 2025-04-09
Payer: MEDICARE

## 2025-04-09 PROCEDURE — 97140 MANUAL THERAPY 1/> REGIONS: CPT | Performed by: OCCUPATIONAL THERAPIST

## 2025-04-09 PROCEDURE — 97535 SELF CARE MNGMENT TRAINING: CPT | Performed by: OCCUPATIONAL THERAPIST

## 2025-04-09 NOTE — PROGRESS NOTES
Iontophoresis     [address]342 Ultrasound      Other     Total  55 4       Plan: Continue to address:  [x]Bandaging  [x] Self Bandaging/Family Assist  [x]MLD  [x]Self Massage  [x]Exercise  [x]Education  []Obtain referral for garments  []Medical Hold  []Discharge to Home Program  Joseph Garcia OTR/L, QUETA

## 2025-04-21 ENCOUNTER — HOSPITAL ENCOUNTER (OUTPATIENT)
Dept: OCCUPATIONAL THERAPY | Age: 75
Setting detail: THERAPIES SERIES
Discharge: HOME | End: 2025-04-21
Payer: MEDICARE

## 2025-04-21 PROCEDURE — 97535 SELF CARE MNGMENT TRAINING: CPT | Performed by: OCCUPATIONAL THERAPIST

## 2025-04-21 PROCEDURE — 97140 MANUAL THERAPY 1/> REGIONS: CPT | Performed by: OCCUPATIONAL THERAPIST

## 2025-04-21 NOTE — PROGRESS NOTES
Occupational Therapy        OCCUPATIONAL THERAPY PROGRESS NOTE  [place] [address] [facility]  [address]              Phone: [phone]             Fax: [phone]     Date:  2025  Initial Evaluation Date: 2025     Evaluating Therapist: VALERIANO Giron/L, CLT-KARENA    Patient Name:  Adrienne Garces    :  1950    Restrictions/Precautions:   fall risk  Diagnosis:  Lymphedema (I89.0)        Date of Surgery/Injury: n/a    Insurance/Certification information:   Medicare Part A and B               [id number]   Plan of care signed (Y/N): N  Visit# / total visits: Evaluation +Visit #3    Referring Practitioner:  Shyam Greene MD    NPI: [id number]   Specific Practitioner Orders: Evaluation and Treatment    Assessment of current deficits   []Pain  []Skin Integrity   [x]Lymphedema   []Functional transfers/mobility   []ADLs   []Strength    []Cognition  []IADLs   []Safety Awareness   []  Motor Endurance    []Fine Motor Coordination   []Balance   []Vision/perception  []Sensation []Gross Motor Coordination  []ROM     OT PLAN OF CARE   OT POC based on physician orders, patient diagnosis and results of clinical assessment    Frequency/Duration:  1-2x per week for 12 treatment sessions from 2025 through 2025   Specific OT Treatment to include:     Plan of Care:     [x]97140-Manual Lymph Drainage and Combined Decongestive Therapy  [x]27627- Skilled Multilayer Short Stretch Compression Bandaging/ Therapeutic Exercise  [x]Skin Care Education [x]HEP including Self MLD Education and/or Self Bandaging  [x]Education for Lymphedema Risks/Precautions     [x] Caregiver Education   []other:      Patient Specific Goal: so my face does not look as swollen     STG: 3 weeks   Patient will demonstrate knowledge and understanding for lymphedema precautions, skin care and self management to decrease progression of lymphedema and risk of infection.  Therapist continued

## 2025-04-30 ENCOUNTER — HOSPITAL ENCOUNTER (OUTPATIENT)
Dept: OCCUPATIONAL THERAPY | Age: 75
Setting detail: THERAPIES SERIES
Discharge: HOME | End: 2025-04-30
Payer: MEDICARE

## 2025-04-30 PROCEDURE — 97140 MANUAL THERAPY 1/> REGIONS: CPT | Performed by: OCCUPATIONAL THERAPIST

## 2025-04-30 PROCEDURE — 97535 SELF CARE MNGMENT TRAINING: CPT | Performed by: OCCUPATIONAL THERAPIST

## 2025-04-30 NOTE — PROGRESS NOTES
[]Bandaging   []Kinesiotaping [x] Education    []Self Massage   []Self Bandaging [] Exercise    []Wound Care  []Hygiene  [] Other        Assessment:  Knowledge of home program:   [] Good [] Fair  [] Poor  Patient is programming at home:             [] Yes  [] No  Family is assisting with programming: [] Yes  [] No  Home programming is consistent:             [] Yes  [] No  Other:   Response to treatment:  good  Instructions for patient:   [x] Verbal [x] Written  Instructions addressed:  manual lymphatic drainage massage        Time In: 0810            Time Out: 0910                      Timed Code Treatment Minutes: 60 minutes      CODE  Minutes  Units   52534 OT Eval Low     85152 OT Eval Medium     11594 OT Eval High     39550 Fluidotherapy     50558 Manual 45 3   62750 Therapeutic Ex     22819 Therapeutic Activity     58826 ADL/COMP Tech Train 15 1   50377 Neuromuscular Re-Ed     70817 OrthoManagementTraining     55881 Paraffin     80973 Electrical Stim - Attended     23611 Iontophoresis     06840 Ultrasound      Other     Total  60 4       Plan: Continue to address:  [x]Bandaging  [x] Self Bandaging/Family Assist  [x]MLD  [x]Self Massage  [x]Exercise  [x]Education  []Obtain referral for garments  []Medical Hold  []Discharge to Home Program  Joseph Garcia, OTR/L, QUETA

## 2025-05-07 ENCOUNTER — HOSPITAL ENCOUNTER (OUTPATIENT)
Dept: OCCUPATIONAL THERAPY | Age: 75
Setting detail: THERAPIES SERIES
Discharge: HOME | End: 2025-05-07
Payer: MEDICARE

## 2025-05-07 PROCEDURE — 97140 MANUAL THERAPY 1/> REGIONS: CPT | Performed by: OCCUPATIONAL THERAPIST

## 2025-05-07 PROCEDURE — 97535 SELF CARE MNGMENT TRAINING: CPT | Performed by: OCCUPATIONAL THERAPIST

## 2025-05-07 NOTE — PROGRESS NOTES
Occupational Therapy  OCCUPATIONAL THERAPY UPDATE/PROGRESS NOTE  [place] [address] [facility]  [address]              Phone: [phone]             Fax: [phone]    Date:  2025  Initial Evaluation Date: 2025     Evaluating Therapist: VALERIANO Giron/L, CLT-KARENA    Patient Name:  Adrienne Garces    :  1950    Restrictions/Precautions:   fall risk  Diagnosis:  Lymphedema (I89.0)         Date of Surgery/Injury: n/a    Insurance/Certification information:  Medicare Part A and B [id number]   Plan of care signed (Y/N): N  Visit#:  5   Total Visits to date:  Evaluation +Visist #5   Current update duration from 2025 to 2025  Cancels/No-shows to date: 1  Last seen by therapist:  2025  Patient reaction to treatment:  patient making steady gains toward therapy goals.  Patient currently performing manual lymphatic drainage massage sequence for head/neck involvement daily.  Patient further utilizing compression garment during the day.  Patient increasing tolerance to wear garment at night.  Patient increasing her activity levels as well as practicing low sodium eating lifestyle.  Patient further following proper skin care with use of moisturizing skin lotion daily.    Garment / DME recommended:  compression garment replacement as appropriate.    Referring Practitioner:  Shyam Greene MD      NPI: [id number]   Specific Practitioner Orders: evaluation and treatment    Assessment of current deficits   []Pain  []Skin Integrity   [x]Lymphedema   []Functional transfers/mobility   []ADLs   []Strength    []Cognition  []IADLs   []Safety Awareness   []  Motor Endurance    []Fine Motor Coordination   []Balance   []Vision/perception  []Sensation []Gross Motor Coordination  []ROM     OT PLAN OF CARE   OT POC based on physician orders, patient diagnosis and results of clinical assessment    Frequency/Duration:  1-2x per week for 12 treatment sessions from

## 2025-06-02 ENCOUNTER — TELEPHONE (OUTPATIENT)
Dept: ENT CLINIC | Age: 75
End: 2025-06-02

## 2025-06-02 NOTE — TELEPHONE ENCOUNTER
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 I cancelled the appointment for her. She woke up with 102 fever and is sick. No soon appointments for me to offer.Please contact patient to reschedule, she is requesting an early morning appointment if possible.

## 2025-06-16 ENCOUNTER — HOSPITAL ENCOUNTER (OUTPATIENT)
Dept: OCCUPATIONAL THERAPY | Age: 75
Setting detail: THERAPIES SERIES
Discharge: HOME | End: 2025-06-16
Payer: MEDICARE

## 2025-06-16 PROCEDURE — 97535 SELF CARE MNGMENT TRAINING: CPT | Performed by: OCCUPATIONAL THERAPIST

## 2025-06-16 NOTE — PROGRESS NOTES
angle - chin 12.5cm  13  13   Mandibular angle - nose 11.75cm  12  12   Mandibular angle - inner eye 12.5cm  12.75  12   Mandibular angle - outer eye 10.5cm  10.5  10   Chin to inner eye 11cm  10.5  12            left         Tragus-chin 16cm 16  16.75   Tragus mouth 12cm 12.25  13   Mandibular angle - chin 13.5cm 13  13.5   Mandibular angle - nose 11.5cm 12  11.75   Mandibular angle - inner eye 12.5cm  12  13.25   Mandibular angle - outer eye 10cm  13  11   Chin to inner eye 10.75cm  10.25  12        10.5                     Restrictions/Precautions:  [] No blood pressure/blood draw in: [] Left Upper Extremity     [] Right Upper Extremity        [x] Fall Risk       Intervention:   []Other    Subjective:patient attended treatment session alone.  Patient with no new issues noted      Rehab Potential: [] Excellent [x] Good [] Fair  [] Poor     Goal Status:  [] Achieved [x] Partially Achieved  [] Not Achieved     Patient Status: [x] Patient and therapist reviewed and agree on continuation of current goals and plan of care             Time In: 0805            Time Out: 58677                      Timed Code Treatment Minutes: 50 minutes      CODE  Minutes  Units   80774 OT Eval Low     88314 OT Eval Medium     57192 OT Eval High     10129 Fluidotherapy     80570 Manual     18665 Therapeutic Ex     61097 Therapeutic Activity     55753 ADL/COMP Tech Train 45 3   88850 Neuromuscular Re-Ed     43410 OrthoManagementTraining     40870 Paraffin     77639 Electrical Stim - Attended     78596 Iontophoresis     91079 Ultrasound      Other       45 3     Electronically signed by: VALERIANO Giron/L, CLT-KARENA      Physician's Certification / Comments      Frequency/Duration 1-2 / week for 12 visits.   Certification period From: 16June 2025  To: 8Sept. 2025     I have reviewed the Plan of Care established for skilled therapy services and certify that the services are required and that they will be provided while the patient is

## 2025-06-27 ENCOUNTER — OFFICE VISIT (OUTPATIENT)
Dept: ENT CLINIC | Age: 75
End: 2025-06-27

## 2025-06-27 VITALS
WEIGHT: 170 LBS | HEART RATE: 61 BPM | DIASTOLIC BLOOD PRESSURE: 83 MMHG | OXYGEN SATURATION: 93 % | BODY MASS INDEX: 33.2 KG/M2 | SYSTOLIC BLOOD PRESSURE: 144 MMHG

## 2025-06-27 DIAGNOSIS — R68.2 DRY MOUTH: ICD-10-CM

## 2025-06-27 DIAGNOSIS — R13.12 OROPHARYNGEAL DYSPHAGIA: ICD-10-CM

## 2025-06-27 DIAGNOSIS — C10.9 OROPHARYNX CANCER (HCC): ICD-10-CM

## 2025-06-27 DIAGNOSIS — E04.1 THYROID NODULE: Primary | ICD-10-CM

## 2025-06-27 PROCEDURE — G8427 DOCREV CUR MEDS BY ELIG CLIN: HCPCS | Performed by: OTOLARYNGOLOGY

## 2025-06-27 PROCEDURE — 3017F COLORECTAL CA SCREEN DOC REV: CPT | Performed by: OTOLARYNGOLOGY

## 2025-06-27 PROCEDURE — G8417 CALC BMI ABV UP PARAM F/U: HCPCS | Performed by: OTOLARYNGOLOGY

## 2025-06-27 PROCEDURE — G8400 PT W/DXA NO RESULTS DOC: HCPCS | Performed by: OTOLARYNGOLOGY

## 2025-06-27 PROCEDURE — 1090F PRES/ABSN URINE INCON ASSESS: CPT | Performed by: OTOLARYNGOLOGY

## 2025-06-27 PROCEDURE — 1036F TOBACCO NON-USER: CPT | Performed by: OTOLARYNGOLOGY

## 2025-06-27 ASSESSMENT — ENCOUNTER SYMPTOMS
STRIDOR: 0
GASTROINTESTINAL NEGATIVE: 1
VOICE CHANGE: 0
RHINORRHEA: 0
WHEEZING: 0
COUGH: 0
SORE THROAT: 0
SINUS PAIN: 0
CHOKING: 0
COLOR CHANGE: 0
SINUS PRESSURE: 0
TROUBLE SWALLOWING: 0

## 2025-06-27 ASSESSMENT — VISUAL ACUITY: OU: 1

## 2025-06-27 NOTE — PROGRESS NOTES
[facility]  Dr. Carreon. BK Dhaliwal Ms.Ed.  New Consult       Patient Name:  Adrienne Garces  :  1950     CHIEF C/O:    Chief Complaint   Patient presents with    New Patient     Thyroid nodule        HISTORY OBTAINED FROM:  patient    HISTORY OF PRESENT ILLNESS:       Adrienne is a 75 y.o. year old female, here today for thyroid nodules. 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 Romario packer . Treated with chemo/RT finished in 2024. Had previous attempted biopsy but was non-diagnostic. Feels her taste is coming back. Has to drink water frequently for dry mouth. Following with the lymphedema clinic. Swallowing has improved and she is taking PO with minimal difficulty.         Past Medical History:   Diagnosis Date    Arthritis     Cancer (HCC)     Hypertension     Hypothyroidism     Stomach ulcer      Past Surgical History:   Procedure Laterality Date    COLONOSCOPY      Dr. Rosen /  Colon polyp    ESOPHAGOGASTRODUODENOSCOPY      Dr. Rosen / History of Ulcers    LARYNGOSCOPY      Dr. Doss    PORT SURGERY N/A 2024    EGD PEG TUBE INSERTION AND MEDIPORT INSERTION [87203 + 93176] performed by Adrian Blanco MD at [facility] OR    PORT SURGERY N/A 3/31/2025    MEDIPORT REMOVAL performed by Adrian Blanco MD at [facility] OR    TOTAL KNEE ARTHROPLASTY Right 10/2023    TUBAL LIGATION  1984    UPPER GASTROINTESTINAL ENDOSCOPY N/A 2024    ESOPHAGOGASTRODUODENOSCOPY PERCUTANEOUS ENDOSCOPIC GASTROSTOMY TUBE PLACEMENT performed by Adrian Blanco MD at [facility] OR       Current Outpatient Medications:     levothyroxine (SYNTHROID) 125 MCG tablet, Take 112 mcg by mouth Daily Take 1 tablet  Monday through Saturday,      0.5 tab on , Disp: , Rfl:   Escitalopram, Ginkgo biloba, Keflex [cephalexin], Penicillins, and Sulfa antibiotics  Social History     Tobacco Use    Smoking status: Never    Smokeless

## (undated) DEVICE — BLADE,STAINLESS-STEEL,15,STRL,DISPOSABLE: Brand: MEDLINE

## (undated) DEVICE — MARKER,SKIN,WI/RULER AND LABELS: Brand: MEDLINE

## (undated) DEVICE — PEG KIT STD 20 FR PUL W/ ENFIT ENDOVIVE

## (undated) DEVICE — TOWEL,OR,DSP,ST,BLUE,STD,6/PK,12PK/CS: Brand: MEDLINE

## (undated) DEVICE — BLADE ES ELASTOMERIC COAT INSUL DURABLE BEND UPTO 90DEG

## (undated) DEVICE — BOOT,SUTURE,STANDARD,YELLOW-IN-BLUE: Brand: MEDLINE

## (undated) DEVICE — SPECIMEN CUP W/LID: Brand: DEROYAL

## (undated) DEVICE — NEEDLE HYPO 25GA L1.5IN BLU POLYPR HUB S STL REG BVL STR

## (undated) DEVICE — 4-PORT MANIFOLD: Brand: NEPTUNE 2

## (undated) DEVICE — NDL CNTR 40CT FM MAG: Brand: MEDLINE INDUSTRIES, INC.

## (undated) DEVICE — LIMB HOLDER, WRIST/ANKLE: Brand: DEROYAL

## (undated) DEVICE — APPLICATOR MEDICATED 26 CC SOLUTION HI LT ORNG CHLORAPREP

## (undated) DEVICE — ELECTRODE PT RET AD L9FT HI MOIST COND ADH HYDRGEL CORDED

## (undated) DEVICE — COVER,LIGHT HANDLE,FLX,1/PK: Brand: MEDLINE INDUSTRIES, INC.

## (undated) DEVICE — SYRINGE MED 10ML TRNSLUC BRL PLUNG BLK MRK POLYPR CTRL

## (undated) DEVICE — GAUZE,SPONGE,4"X4",16PLY,XRAY,STRL,LF: Brand: MEDLINE

## (undated) DEVICE — WIPES SKIN CLOTH READYPREP 9 X 10.5 IN 2% CHLORHEX GLUCONATE CHG PREOP

## (undated) DEVICE — GLOVE ORANGE PI 7 1/2   MSG9075

## (undated) DEVICE — SYRINGE MED 10ML LUERLOCK TIP W/O SFTY DISP

## (undated) DEVICE — LIQUIBAND RAPID ADHESIVE 36/CS 0.8ML: Brand: MEDLINE

## (undated) DEVICE — PACK,LAPAROTOMY,NO GOWNS: Brand: MEDLINE

## (undated) DEVICE — GOWN,SIRUS,NONRNF,SETINSLV,XL,20/CS: Brand: MEDLINE

## (undated) DEVICE — BASIC DOUBLE BASIN 2-LF: Brand: MEDLINE INDUSTRIES, INC.

## (undated) DEVICE — COVER PRB W14XL147CM TELESCOPICALLY FLD EXT LEN CIV-FLEX

## (undated) DEVICE — HUMBLES LAPWRAP® (10/CASE): Brand: HUMBLES LAPWRAP®